# Patient Record
Sex: FEMALE | Race: BLACK OR AFRICAN AMERICAN | Employment: UNEMPLOYED | URBAN - METROPOLITAN AREA
[De-identification: names, ages, dates, MRNs, and addresses within clinical notes are randomized per-mention and may not be internally consistent; named-entity substitution may affect disease eponyms.]

---

## 2017-07-20 ENCOUNTER — HOSPITAL ENCOUNTER (EMERGENCY)
Age: 32
Discharge: HOME | End: 2017-07-20
Attending: EMERGENCY MEDICINE
Payer: MEDICAID

## 2017-07-20 VITALS
BODY MASS INDEX: 37.56 KG/M2 | DIASTOLIC BLOOD PRESSURE: 84 MMHG | RESPIRATION RATE: 20 BRPM | TEMPERATURE: 98.6 F | OXYGEN SATURATION: 100 % | SYSTOLIC BLOOD PRESSURE: 122 MMHG | HEIGHT: 64 IN | WEIGHT: 220 LBS | HEART RATE: 98 BPM
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
## 2017-07-20 DIAGNOSIS — M54.9 BACK PAIN, UNSPECIFIED BACK LOCATION, UNSPECIFIED BACK PAIN LATERALITY, UNSPECIFIED CHRONICITY: ICD-10-CM

## 2017-07-20 PROCEDURE — 99282 EMERGENCY DEPT VISIT SF MDM: CPT

## 2017-07-20 RX ORDER — NAPROXEN 500 MG/1
500 TABLET ORAL 2 TIMES DAILY WITH MEALS
COMMUNITY
End: 2018-12-02

## 2017-07-20 RX ORDER — HYDROCODONE BITARTRATE AND ACETAMINOPHEN 5; 325 MG/1; MG/1
TABLET ORAL
COMMUNITY
End: 2018-12-02

## 2017-07-20 RX ORDER — TRAMADOL HYDROCHLORIDE 50 MG/1
50 TABLET ORAL
Qty: 20 TAB | Refills: 0 | Status: SHIPPED | OUTPATIENT
Start: 2017-07-20 | End: 2018-12-02
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
## 2017-07-20 NOTE — LETTER
[facility] [place] 
[facility] 
[address] [address] [phone] Work/School Note Date: 7/20/2017 To Whom It May concern: 
 
Joyce Kennedy was seen and treated today in the emergency room by the following provider(s): 
Physician Assistant: XUAN Camargo. Please excuse missed work. Joyce Kennedy may return to work on 7/24/2017.  
 
Sincerely, 
 
 
 
 
Shane Muñoz PA-C
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
## 2017-09-27 ENCOUNTER — HOSPITAL ENCOUNTER (OUTPATIENT)
Dept: GENERAL RADIOLOGY | Age: 32
Discharge: HOME | End: 2017-09-27
Payer: COMMERCIAL

## 2017-09-27 DIAGNOSIS — M79.643 HAND PAIN: ICD-10-CM

## 2017-09-27 PROCEDURE — 73130 X-RAY EXAM OF HAND: CPT

## 2018-08-09 ENCOUNTER — HOSPITAL ENCOUNTER (OUTPATIENT)
Dept: GENERAL RADIOLOGY | Age: 33
Discharge: HOME | End: 2018-08-09
Attending: FAMILY MEDICINE
Payer: MEDICAID

## 2018-08-09 DIAGNOSIS — M79.672 LEFT FOOT PAIN: ICD-10-CM

## 2018-08-09 PROCEDURE — 73630 X-RAY EXAM OF FOOT: CPT

## 2018-12-02 ENCOUNTER — HOSPITAL ENCOUNTER (EMERGENCY)
Age: 33
Discharge: HOME | End: 2018-12-02
Attending: EMERGENCY MEDICINE
Payer: MEDICAID

## 2018-12-02 ENCOUNTER — APPOINTMENT (OUTPATIENT)
Dept: ULTRASOUND IMAGING | Age: 33
End: 2018-12-02
Attending: PHYSICIAN ASSISTANT
Payer: MEDICAID

## 2018-12-02 VITALS
HEIGHT: 64 IN | RESPIRATION RATE: 16 BRPM | OXYGEN SATURATION: 100 % | TEMPERATURE: 99.3 F | BODY MASS INDEX: 32.27 KG/M2 | SYSTOLIC BLOOD PRESSURE: 129 MMHG | DIASTOLIC BLOOD PRESSURE: 63 MMHG | WEIGHT: 189 LBS | HEART RATE: 90 BPM

## 2018-12-02 DIAGNOSIS — O26.891 PELVIC PAIN AFFECTING PREGNANCY IN FIRST TRIMESTER, ANTEPARTUM: Primary | ICD-10-CM

## 2018-12-02 DIAGNOSIS — N83.209 CYST OF OVARY, UNSPECIFIED LATERALITY: ICD-10-CM

## 2018-12-02 DIAGNOSIS — R10.2 PELVIC PAIN AFFECTING PREGNANCY IN FIRST TRIMESTER, ANTEPARTUM: Primary | ICD-10-CM

## 2018-12-02 LAB
ABO + RH BLD: NORMAL
ALBUMIN SERPL-MCNC: 3.9 G/DL (ref 3.4–5)
ALBUMIN/GLOB SERPL: 1.1 {RATIO} (ref 0.8–1.7)
ALP SERPL-CCNC: 93 U/L (ref 45–117)
ALT SERPL-CCNC: 16 U/L (ref 13–56)
ANION GAP SERPL CALC-SCNC: 9 MMOL/L (ref 3–18)
APPEARANCE UR: CLEAR
AST SERPL-CCNC: 7 U/L (ref 15–37)
BASOPHILS # BLD: 0 K/UL (ref 0–0.1)
BASOPHILS NFR BLD: 0 % (ref 0–2)
BILIRUB SERPL-MCNC: 0.3 MG/DL (ref 0.2–1)
BILIRUB UR QL: NEGATIVE
BUN SERPL-MCNC: 7 MG/DL (ref 7–18)
BUN/CREAT SERPL: 8
CALCIUM SERPL-MCNC: 9.4 MG/DL (ref 8.5–10.1)
CHLORIDE SERPL-SCNC: 105 MMOL/L (ref 100–108)
CO2 SERPL-SCNC: 25 MMOL/L (ref 21–32)
COLOR UR: YELLOW
CREAT SERPL-MCNC: 0.86 MG/DL (ref 0.6–1.3)
DIFFERENTIAL METHOD BLD: ABNORMAL
EOSINOPHIL # BLD: 0.2 K/UL (ref 0–0.4)
EOSINOPHIL NFR BLD: 1 % (ref 0–5)
ERYTHROCYTE [DISTWIDTH] IN BLOOD BY AUTOMATED COUNT: 16.6 % (ref 11.6–14.5)
GLOBULIN SER CALC-MCNC: 3.7 G/DL (ref 2–4)
GLUCOSE SERPL-MCNC: 108 MG/DL (ref 74–99)
GLUCOSE UR STRIP.AUTO-MCNC: NEGATIVE MG/DL
HCG SERPL-ACNC: ABNORMAL MIU/ML (ref 0–10)
HCG UR QL: POSITIVE
HCT VFR BLD AUTO: 38.1 % (ref 35–45)
HGB BLD-MCNC: 12.5 G/DL (ref 12–16)
HGB UR QL STRIP: NEGATIVE
KETONES UR QL STRIP.AUTO: ABNORMAL MG/DL
LEUKOCYTE ESTERASE UR QL STRIP.AUTO: NEGATIVE
LYMPHOCYTES # BLD: 3.3 K/UL (ref 0.9–3.6)
LYMPHOCYTES NFR BLD: 27 % (ref 21–52)
MCH RBC QN AUTO: 21 PG (ref 24–34)
MCHC RBC AUTO-ENTMCNC: 32.8 G/DL (ref 31–37)
MCV RBC AUTO: 63.9 FL (ref 74–97)
MONOCYTES # BLD: 0.6 K/UL (ref 0.05–1.2)
MONOCYTES NFR BLD: 5 % (ref 3–10)
NEUTS SEG # BLD: 8.3 K/UL (ref 1.8–8)
NEUTS SEG NFR BLD: 67 % (ref 40–73)
NITRITE UR QL STRIP.AUTO: NEGATIVE
PH UR STRIP: 5.5 [PH] (ref 5–8)
PLATELET # BLD AUTO: 281 K/UL (ref 135–420)
POTASSIUM SERPL-SCNC: 3.8 MMOL/L (ref 3.5–5.5)
PROT SERPL-MCNC: 7.6 G/DL (ref 6.4–8.2)
PROT UR STRIP-MCNC: NEGATIVE MG/DL
RBC # BLD AUTO: 5.96 M/UL (ref 4.2–5.3)
SERVICE CMNT-IMP: NORMAL
SODIUM SERPL-SCNC: 139 MMOL/L (ref 136–145)
SP GR UR REFRACTOMETRY: 1.03 (ref 1–1.03)
UROBILINOGEN UR QL STRIP.AUTO: 1 EU/DL (ref 0.2–1)
WBC # BLD AUTO: 12.4 K/UL (ref 4.6–13.2)
WET PREP GENITAL: NORMAL

## 2018-12-02 PROCEDURE — 87210 SMEAR WET MOUNT SALINE/INK: CPT

## 2018-12-02 PROCEDURE — 85025 COMPLETE CBC W/AUTO DIFF WBC: CPT

## 2018-12-02 PROCEDURE — 81025 URINE PREGNANCY TEST: CPT

## 2018-12-02 PROCEDURE — 99284 EMERGENCY DEPT VISIT MOD MDM: CPT

## 2018-12-02 PROCEDURE — 81003 URINALYSIS AUTO W/O SCOPE: CPT

## 2018-12-02 PROCEDURE — 93976 VASCULAR STUDY: CPT

## 2018-12-02 PROCEDURE — 80053 COMPREHEN METABOLIC PANEL: CPT

## 2018-12-02 PROCEDURE — 86900 BLOOD TYPING SEROLOGIC ABO: CPT

## 2018-12-02 PROCEDURE — 84702 CHORIONIC GONADOTROPIN TEST: CPT

## 2018-12-02 PROCEDURE — 87491 CHLMYD TRACH DNA AMP PROBE: CPT

## 2018-12-02 NOTE — ED PROVIDER NOTES
EMERGENCY DEPARTMENT HISTORY AND PHYSICAL EXAM 
 
Date: 2018 Patient Name: Gail Lemus History of Presenting Illness Chief Complaint Patient presents with  Abdominal Pain History Provided By: Patient Chief Complaint: RLQ pain Duration: 2 days Timing:  Constant Location: RLQ Quality: Aching Severity: 8 out of 10 Associated Symptoms: back pain, nausea, breast tenderness Additional History (Context):  
8:48 AM 
Gail Lemus is a 28 y.o. female with no relevant PMHX presents to the emergency department C/O aching RLQ pain (8/10) onset 2 days ago. Associated sxs include back pain, nausea, breast tenderness. She notes she was doing some heavy lifting two days ago, and the pain came on later. Pt denies previous episodes of similar symptoms. LNMP 10/18/2018, she admits she may be pregnant. A0. SHx  in . Pt denies vomiting, diarrhea, abnormal BMs, vaginal bleeding, vaginal discharge, dysuria, SHx appendicitis, and any other sxs or complaints. PCP: Clint Song MD 
 
 
 
Past History Past Medical History: 
History reviewed. No pertinent past medical history. Past Surgical History: 
Past Surgical History:  
Procedure Laterality Date  HX GYN    
  Family History: 
History reviewed. No pertinent family history. Social History: 
Social History Tobacco Use  Smoking status: Never Smoker  Smokeless tobacco: Never Used Substance Use Topics  Alcohol use: No  
 Drug use: No  
 
 
Allergies: Allergies Allergen Reactions  Penicillins Rash Review of Systems Review of Systems Constitutional: Negative for chills and fever. Gastrointestinal: Positive for abdominal pain (RLQ) and nausea. Negative for diarrhea and vomiting.  
     (-) Abnormal BMs Genitourinary: Negative for difficulty urinating, dysuria, flank pain, frequency, hematuria, urgency, vaginal bleeding and vaginal discharge. Musculoskeletal: Positive for back pain. Skin:  
     (+) Breast tenderness All other systems reviewed and are negative. Physical Exam  
 
Vitals:  
 12/02/18 0842 12/02/18 1145 BP: 135/75 129/63 Pulse: 95 90 Resp: 16 16 Temp: 99.2 °F (37.3 °C) 99.3 °F (37.4 °C) SpO2: 100% 100% Weight: 85.7 kg (189 lb) Height: 5' 4\" (1.626 m) Physical Exam  
Constitutional: She is oriented to person, place, and time. She appears well-developed and well-nourished. No distress. Female in NAD. Alert. Appears comfortable. HENT:  
Head: Normocephalic and atraumatic. Right Ear: External ear normal.  
Left Ear: External ear normal.  
Nose: Nose normal.  
Eyes: Conjunctivae are normal.  
Neck: Normal range of motion. Cardiovascular: Normal rate, regular rhythm, normal heart sounds and intact distal pulses. Exam reveals no gallop and no friction rub. No murmur heard. Pulmonary/Chest: Effort normal and breath sounds normal. No accessory muscle usage. No tachypnea. No respiratory distress. She has no decreased breath sounds. She has no wheezes. She has no rhonchi. Abdominal: Soft. Normal appearance. She exhibits no mass. There is tenderness (right sided pelvic tenderness). There is no rigidity, no rebound, no guarding, no CVA tenderness and no tenderness at McBurney's point. Musculoskeletal: Normal range of motion. Neurological: She is alert and oriented to person, place, and time. Skin: Skin is warm and dry. She is not diaphoretic. Psychiatric: She has a normal mood and affect. Judgment normal.  
Nursing note and vitals reviewed. Diagnostic Study Results Labs: 
  
No results found for this or any previous visit (from the past 12 hour(s)). Radiologic Studies: 
US UTS TRANSVAGINAL OB Final Result IMPRESSION: 
  
1. 6.0 week live intrauterine gestation with a small subchorionic hemorrhage, as 
described above. 
  
 2.  Left ovarian/adnexal cysts, likely follicular and/or corpus luteal in 
etiology. 
  
Dedicated obstetric follow-up is required. CT Results  (Last 48 hours) None CXR Results  (Last 48 hours) None MEDICATIONS GIVEN: 
Medications - No data to display Medical Decision Making I am the first provider for this patient. I reviewed the vital signs, available nursing notes, past medical history, past surgical history, family history and social history. Vital Signs: Reviewed the patient's vital signs. Pulse Oximetry Analysis: 100% on RA Records Reviewed: Nursing Notes and Old Medical Records Provider Notes (Medical Decision Making): ectopic, miscarriage, STI, UTI, cyst. Doubt torsion. Doubt appy Procedures:  
Pelvic Exam 
Date/Time: 12/2/2018 9:07 AM 
Procedure duration:  15 minutes. Documented by: Pam Glass PA-C. Exam assisted by:  Female chaperone in room. Type of exam performed: bimanual and speculum. External genitalia appearance: normal.   
Vaginal exam:  discharge. The amount of discharge was:  moderate. The discharge was white and thick. Cervical exam:  normal.   
Specimen(s) collected:  chlamydia, GC and vaginal culture. Bimanual exam:  normal.   
Patient tolerance: Patient tolerated the procedure well with no immediate complications ED Course:  
8:48 AM Initial assessment performed. The patients presenting problems have been discussed, and they are in agreement with the care plan formulated and outlined with them. I have encouraged them to ask questions as they arise throughout their visit. Diagnosis and Disposition US reveals living IUP. Labs unremarkable. No bleeding. Unremarkable pelvic. Evidence of cyst. OB FU. Reasons to RTED discussed with pt. All questions answered. Pt feels comfortable going home at this time. Pt expressed understanding and she agrees with plan.  
 
 
DISCHARGE NOTE: 
11:43 AM 
 Mamta Mohr  results have been reviewed with her. She has been counseled regarding her diagnosis, treatment, and plan. She verbally conveys understanding and agreement of the signs, symptoms, diagnosis, treatment and prognosis and additionally agrees to follow up as discussed. She also agrees with the care-plan and conveys that all of her questions have been answered. I have also provided discharge instructions for her that include: educational information regarding their diagnosis and treatment, and list of reasons why they would want to return to the ED prior to their follow-up appointment, should her condition change. She has been provided with education for proper emergency department utilization. CLINICAL IMPRESSION: 
 
1. Pelvic pain affecting pregnancy in first trimester, antepartum 2. Cyst of ovary, unspecified laterality PLAN: 
1. D/C Home 2. There are no discharge medications for this patient. 3.  
Follow-up Information Follow up With Specialties Details Why Contact Info Smita Trammell MD Obstetrics & Gynecology, Gynecology, Obstetrics Schedule an appointment as soon as possible for a visit in 2 days Follow up with [address] [place] [address] [address] [place] [address] 
[phone] [facility] EMERGENCY DEPT Emergency Medicine Go to As needed, If symptoms worsen [address] [place] [address] 
[phone]  
  
 
_______________________________ Attestations:  
 
SCRIBE ATTESTATION: 
This note is prepared by Leslie Jimenez, acting as Scribe for Remedios Chris PA-C. 
 
PROVIDER ATTESTATION: 
Remedios Chris PA-C: The scribe's documentation has been prepared under my direction and personally reviewed by me in its entirety. I confirm that the note above accurately reflects all work, treatment, procedures, and medical decision making performed by me.  
_______________________________

## 2018-12-02 NOTE — DISCHARGE INSTRUCTIONS
Functional Ovarian Cyst: Care Instructions  Your Care Instructions    A functional ovarian cyst is a sac that forms on the surface of a woman's ovary during ovulation. The sac holds a maturing egg. Usually the sac goes away after the egg is released. But if the egg is not released, or if the sac closes up after the egg is released, the sac can swell up with fluid and form a cyst.  Functional ovarian cysts are different than ovarian growths caused by other problems, such as cancer. Most functional ovarian cysts cause no symptoms and go away on their own. Some cause mild pain. Others can cause severe pain when they rupture or bleed. Follow-up care is a key part of your treatment and safety. Be sure to make and go to all appointments, and call your doctor if you are having problems. It's also a good idea to know your test results and keep a list of the medicines you take. How can you care for yourself at home? · Use heat, such as a hot water bottle, a heating pad set on low, or a warm bath, to relax tense muscles and relieve cramping. · Be safe with medicines. Take pain medicines exactly as directed. ? If the doctor gave you a prescription medicine for pain, take it as prescribed. ? If you are not taking a prescription pain medicine, ask your doctor if you can take an over-the-counter medicine. · Avoid constipation. Make sure you drink enough fluids and include fruits, vegetables, and fiber in your diet each day. Constipation does not cause ovarian cysts, but it may make your pelvic pain worse. When should you call for help? Call your doctor now or seek immediate medical care if:    · You have severe vaginal bleeding.     · You have new or worse belly or pelvic pain.    Watch closely for changes in your health, and be sure to contact your doctor if:    · You have unusual vaginal bleeding.     · You do not get better as expected. Where can you learn more?   Go to http://herson-pavithra.info/. Enter O021 in the search box to learn more about \"Functional Ovarian Cyst: Care Instructions. \"  Current as of: May 15, 2018  Content Version: 11.8  © 4413-6565 [employer]. Care instructions adapted under license by [employer] (which disclaims liability or warranty for this information). If you have questions about a medical condition or this instruction, always ask your healthcare professional. [address] any warranty or liability for your use of this information.

## 2018-12-02 NOTE — LETTER
[facility] [place] 
[facility] EMERGENCY DEPT 
[address] [address] 
[phone] Work/School Note Date: 12/2/2018 To Whom It May concern: 
 
Tez Nuñez was seen and treated today in the emergency room by the following provider(s): 
Attending Provider: Walker Juares MD 
Physician Assistant: Zina Purvis PA-C. Tez Nuñez may return to work on 12/03/2018. Sincerely, Kaitlin Mercado PA-C

## 2018-12-03 LAB
C TRACH RRNA SPEC QL NAA+PROBE: NEGATIVE
N GONORRHOEA RRNA SPEC QL NAA+PROBE: NEGATIVE
SPECIMEN SOURCE: NORMAL

## 2018-12-19 LAB
ANTIBODY SCREEN, EXTERNAL: NEGATIVE
CHLAMYDIA, EXTERNAL: NEGATIVE
HBSAG, EXTERNAL: NEGATIVE
HIV, EXTERNAL: NEGATIVE
N. GONORRHEA, EXTERNAL: NEGATIVE
RPR, EXTERNAL: NORMAL
RUBELLA, EXTERNAL: NORMAL
TYPE, ABO & RH, EXTERNAL: NORMAL
URINALYSIS, EXTERNAL: NEGATIVE

## 2018-12-22 ENCOUNTER — HOSPITAL ENCOUNTER (EMERGENCY)
Age: 33
Discharge: HOME | End: 2018-12-22
Attending: EMERGENCY MEDICINE
Payer: MEDICAID

## 2018-12-22 VITALS
TEMPERATURE: 98 F | OXYGEN SATURATION: 100 % | BODY MASS INDEX: 38.41 KG/M2 | RESPIRATION RATE: 16 BRPM | DIASTOLIC BLOOD PRESSURE: 79 MMHG | WEIGHT: 225 LBS | SYSTOLIC BLOOD PRESSURE: 132 MMHG | HEIGHT: 64 IN | HEART RATE: 89 BPM

## 2018-12-22 DIAGNOSIS — O26.851 SPOTTING AFFECTING PREGNANCY IN FIRST TRIMESTER: Primary | ICD-10-CM

## 2018-12-22 LAB
ERYTHROCYTE [DISTWIDTH] IN BLOOD BY AUTOMATED COUNT: 16.5 % (ref 11.6–14.5)
HCG SERPL-ACNC: ABNORMAL MIU/ML (ref 0–10)
HCT VFR BLD AUTO: 36.6 % (ref 35–45)
HGB BLD-MCNC: 11.8 G/DL (ref 12–16)
MCH RBC QN AUTO: 20.8 PG (ref 24–34)
MCHC RBC AUTO-ENTMCNC: 32.2 G/DL (ref 31–37)
MCV RBC AUTO: 64.6 FL (ref 74–97)
PLATELET # BLD AUTO: 249 K/UL (ref 135–420)
RBC # BLD AUTO: 5.67 M/UL (ref 4.2–5.3)
WBC # BLD AUTO: 12.7 K/UL (ref 4.6–13.2)

## 2018-12-22 PROCEDURE — 85027 COMPLETE CBC AUTOMATED: CPT

## 2018-12-22 PROCEDURE — 99283 EMERGENCY DEPT VISIT LOW MDM: CPT

## 2018-12-22 PROCEDURE — 84702 CHORIONIC GONADOTROPIN TEST: CPT

## 2018-12-22 NOTE — DISCHARGE INSTRUCTIONS
Vaginal Bleeding in Nonpregnant Women: Care Instructions  Your Care Instructions    Many women have bleeding or spotting between periods. Lots of things can cause it. You may bleed because of hormone problems, stress, or ovulation. Fibroids and IUDs (intrauterine devices) can also cause bleeding. If your bleeding or spotting is caused by one of these things and is not heavy or doesn't happen often, you probably don't need to worry. But in rare cases, infection, cancer, or other serious conditions can cause bleeding. So you may need more tests to find the cause of your bleeding. The doctor has checked you carefully, but problems can develop later. If you notice any problems or new symptoms, get medical treatment right away. Follow-up care is a key part of your treatment and safety. Be sure to make and go to all appointments, and call your doctor if you are having problems. It's also a good idea to know your test results and keep a list of the medicines you take. How can you care for yourself at home? · Take pain medicines exactly as directed. ? If the doctor gave you a prescription medicine for pain, take it as prescribed. ? If you are not taking a prescription pain medicine, ask your doctor if you can take an over-the-counter medicine. Do not take aspirin, which may make bleeding worse. · If your doctor prescribed birth control pills for your bleeding, take them as directed. · Eat foods that are high in iron and vitamin C. Foods high in iron include red meat, shellfish, eggs, beans, and leafy green vegetables. Foods high in vitamin C include citrus fruits, tomatoes, and broccoli. Ask your doctor if you need to take iron pills or a multivitamin. · Ask your doctor when it is okay to have sex. When should you call for help? Call 911 anytime you think you may need emergency care.  For example, call if:    · You passed out (lost consciousness).    Call your doctor now or seek immediate medical care if:    · You have severe vaginal bleeding.     · You are dizzy or lightheaded, or you feel like you may faint.     · You have new or worse belly or pelvic pain.    Watch closely for changes in your health, and be sure to contact your doctor if:    · Your bleeding gets worse.     · You think you might be pregnant.     · You do not get better as expected. Where can you learn more? Go to http://herson-pavithra.info/. Enter Q734 in the search box to learn more about \"Vaginal Bleeding in Nonpregnant Women: Care Instructions. \"  Current as of: May 15, 2018  Content Version: 11.8  © 4616-9676 [employer]. Care instructions adapted under license by [employer] (which disclaims liability or warranty for this information). If you have questions about a medical condition or this instruction, always ask your healthcare professional. [address] any warranty or liability for your use of this information.

## 2018-12-22 NOTE — LETTER
[facility] [place] 
[facility] 
[address] [address] 
[phone] Work/School Note Date: 12/22/2018 To Whom It May concern: 
 
Gely Zhao was seen and treated today in the emergency room by the following provider(s): 
Attending Provider: Keegan Lazcano MD 
Physician Assistant: Kalpesh Zamudio. Gely Zhao may return to work on 12/23/2018. Sincerely, Miya Mon PA-C

## 2018-12-22 NOTE — ED TRIAGE NOTES
Pt states that she is 8 weeks pregnant, went to restroom and noticed she was spotting blood when she wiped;   Denies abd pain at present

## 2018-12-22 NOTE — ED NOTES
I have reviewed discharge instructions with the patient. The patient verbalized understanding. Patient armband removed and shredded. Work note provided. Ambulated to lobby with stable gait.

## 2018-12-22 NOTE — ED PROVIDER NOTES
EMERGENCY DEPARTMENT HISTORY AND PHYSICAL EXAM    Date: 2018  Patient Name: Martha Hand    History of Presenting Illness     Chief Complaint   Patient presents with    Pregnancy Problem         History Provided By: Patient    Chief Complaint: Vaginal spotting  Duration: PTA  Timing:  Acute  Location: Vagina  Severity: Mild     Additional History (Context):   9:53 AM  Martha Hand is a 28 y.o. female C/O vaginal \"pinkish\" spotting onset PTA x 1 episode. A0 ~8 weeks pregnant. Followed by [facility].- last seen 3 days ago- reports nml US, bloodwork. Previously seen here on 2018 for RLQ pain and spotting. IUP confirmed via US and labs age appropriate. OB has placed her on light-duty restrictions at work. with frequent breaks and no lifting over 20 lbs. Pt denies cramping abdominal pain, recent heavy lifting, recent strenuous activity, recent sexual intercourse. PCP: Ermias Crisostomo MD        Past History     Past Medical History:  History reviewed. No pertinent past medical history. Past Surgical History:  Past Surgical History:   Procedure Laterality Date    HX GYN             Family History:  History reviewed. No pertinent family history. Social History:  Social History     Tobacco Use    Smoking status: Never Smoker    Smokeless tobacco: Never Used   Substance Use Topics    Alcohol use: No    Drug use: No       Allergies: Allergies   Allergen Reactions    Penicillins Rash       Review of Systems   Review of Systems   Constitutional: Negative for activity change and fatigue. Gastrointestinal: Negative for abdominal pain. Genitourinary: Positive for vaginal bleeding (minor \"pinkish\" spotting). Negative for dysuria, pelvic pain, vaginal discharge and vaginal pain.        Physical Exam     Vitals:    18 0941   BP: 132/79   Pulse: 89   Resp: 16   Temp: 98 °F (36.7 °C)   SpO2: 100%   Weight: 102.1 kg (225 lb)   Height: 5' 4\" (1.626 m)     Physical Exam   Nursing note and vitals reviewed. NURSING NOTE AND VITALS REVIEWED. PSHX, PFHX, PMHX REVIEWED. CONSTITUTIONAL: Well developed/nourished. Awake, alert. Non-toxic appearance. Not diaphoretic. Afebrile. Laying comfortable. Talking on phone. HEAD: NC/AT. EYES: EOMI. Sclera anicteric. ENT: Ears normal to external inspection. Mucous membranes moist    NECK: Neck supple without meningismus. PULMONARY/CHEST: No acute respiratory distress. Talking in full sentences w/o accessory muscle use. ABDOMINAL: Obese. Soft. Non-distended. No tenderness. No fetal parts palpated, uterus difficult to palpate 2/2 habitus. No rebound, guarding, or rigidity. : chaperoned by Gianna Lo RN. Bimanual exam only- os thick, not effaced and closed. Scant blood on gloves. MUSCULOSKELETAL: FROM x 4. No muscular tenderness. No pedal edema. SKIN:  Skin warm and dry. No diaphoresis, rashes or lesions visible. Skin intact. NEUROLOGICAL: Alert, awake. Age appropriate behavior. Appropriately oriented. Normal speech. neurological deficits appreciated. Diagnostic Study Results     Labs:     Recent Results (from the past 12 hour(s))   CBC W/O DIFF    Collection Time: 12/22/18 10:25 AM   Result Value Ref Range    WBC 12.7 4.6 - 13.2 K/uL    RBC 5.67 (H) 4.20 - 5.30 M/uL    HGB 11.8 (L) 12.0 - 16.0 g/dL    HCT 36.6 35.0 - 45.0 %    MCV 64.6 (L) 74.0 - 97.0 FL    MCH 20.8 (L) 24.0 - 34.0 PG    MCHC 32.2 31.0 - 37.0 g/dL    RDW 16.5 (H) 11.6 - 14.5 %    PLATELET 232 923 - 711 K/uL   BETA HCG, QT    Collection Time: 12/22/18 10:25 AM   Result Value Ref Range    Beta HCG, QT 93,450 (H) 0 - 10 MIU/ML       Radiologic Studies:  No orders to display     CT Results  (Last 48 hours)    None        CXR Results  (Last 48 hours)    None          Medical Decision Making   I am the first provider for this patient.     I reviewed the vital signs, available nursing notes, past medical history, past surgical history, family history and social history. Vital Signs: Reviewed the patient's vital signs. Pulse Oximetry Analysis: 100% on RA    Records Reviewed: Nursing Notes, Old Medical Records, Previous Radiology Studies and Previous Laboratory Studies     Procedures:  Procedures    ED Course:   9:53 AM Initial assessment performed. The patients presenting problems have been discussed, and they are in agreement with the care plan formulated and outlined with them. I have encouraged them to ask questions as they arise throughout their visit. MDM: prior ED note and corresponding labs/imaging reviewed. + IUP w/ small subchorionic hemorrhage, quant age appropriate. ABO + no rhogam required. Today beta quant risen appropriately for 8 wks gestation. No acute drop in H&H. Os closed. DDx: nml 1st trimester spotting vs subchorionic hemorrhage vs threatened AB. Continue w/ light duty restrictions and fu w/ OB this week. RTN ED if bleeding worsens or pain develops. Stable for dc home. Diagnosis and Disposition     DISCHARGE NOTE:  12:08 PM  Araseli Leahy  results have been reviewed with her. She has been counseled regarding her diagnosis, treatment, and plan. She verbally conveys understanding and agreement of the signs, symptoms, diagnosis, treatment and prognosis and additionally agrees to follow up as discussed. She also agrees with the care-plan and conveys that all of her questions have been answered. I have also provided discharge instructions for her that include: educational information regarding their diagnosis and treatment, and list of reasons why they would want to return to the ED prior to their follow-up appointment, should her condition change. She has been provided with education for proper emergency department utilization. CLINICAL IMPRESSION:    1. Spotting affecting pregnancy in first trimester        PLAN:  1. D/C Home  2. There are no discharge medications for this patient.     3.   Follow-up Information     Follow up With Specialties Details Why Contact Info    Logan Gallardo MD Family Practice Schedule an appointment as soon as possible for a visit in 2 days Follow up with Primary Care.  [address]  [address] [place] [address] [address]      [facility] Emergency Medicine Go to As needed, If symptoms worsen [address]  [address] [facility] [address] [address] [place] [address] And Internal Medicine  Schedule an appointment as soon as possible for a visit in 2 days Follow up with OBGYN. [address]  [phone]        ___________________________     Attestations:     SCRIBE ATTESTATION:  This note is prepared by Constance Rodríguez, acting as Scribe for Baldo Pelayo PA-C.    PROVIDER ATTESTATION:  Baldo Pelayo PA-C: The scribe's documentation has been prepared under my direction and personally reviewed by me in its entirety.  I confirm that the note above accurately reflects all work, treatment, procedures, and medical decision making performed by me.   ___________________________

## 2019-06-20 LAB — GRBS, EXTERNAL: NEGATIVE

## 2019-07-19 ENCOUNTER — HOSPITAL ENCOUNTER (INPATIENT)
Age: 34
LOS: 4 days | Discharge: HOME | DRG: 540 | End: 2019-07-23
Attending: OBSTETRICS & GYNECOLOGY | Admitting: OBSTETRICS & GYNECOLOGY
Payer: MEDICAID

## 2019-07-19 DIAGNOSIS — O34.211 MATERNAL CARE DUE TO LOW TRANSVERSE UTERINE SCAR FROM PREVIOUS CESAREAN DELIVERY: Primary | ICD-10-CM

## 2019-07-19 PROBLEM — O34.219 MATERNAL CARE FOR SCAR FROM PREVIOUS CESAREAN DELIVERY: Status: ACTIVE | Noted: 2019-07-19

## 2019-07-19 PROBLEM — Z3A.38 38 WEEKS GESTATION OF PREGNANCY: Status: ACTIVE | Noted: 2019-07-19

## 2019-07-19 LAB
BASOPHILS # BLD: 0 K/UL (ref 0–0.1)
BASOPHILS NFR BLD: 0 % (ref 0–2)
DIFFERENTIAL METHOD BLD: ABNORMAL
EOSINOPHIL # BLD: 0.2 K/UL (ref 0–0.4)
EOSINOPHIL NFR BLD: 2 % (ref 0–5)
ERYTHROCYTE [DISTWIDTH] IN BLOOD BY AUTOMATED COUNT: 17.8 % (ref 11.6–14.5)
HCT VFR BLD AUTO: 36.7 % (ref 35–45)
HGB BLD-MCNC: 12 G/DL (ref 12–16)
LYMPHOCYTES # BLD: 2.8 K/UL (ref 0.9–3.6)
LYMPHOCYTES NFR BLD: 27 % (ref 21–52)
MCH RBC QN AUTO: 20.9 PG (ref 24–34)
MCHC RBC AUTO-ENTMCNC: 32.7 G/DL (ref 31–37)
MCV RBC AUTO: 64 FL (ref 74–97)
MONOCYTES # BLD: 0.6 K/UL (ref 0.05–1.2)
MONOCYTES NFR BLD: 6 % (ref 3–10)
NEUTS SEG # BLD: 7.1 K/UL (ref 1.8–8)
NEUTS SEG NFR BLD: 65 % (ref 40–73)
PLATELET # BLD AUTO: 218 K/UL (ref 135–420)
RBC # BLD AUTO: 5.73 M/UL (ref 4.2–5.3)
WBC # BLD AUTO: 10.7 K/UL (ref 4.6–13.2)

## 2019-07-19 PROCEDURE — 85025 COMPLETE CBC W/AUTO DIFF WBC: CPT

## 2019-07-19 PROCEDURE — 65270000029 HC RM PRIVATE

## 2019-07-19 PROCEDURE — 86900 BLOOD TYPING SEROLOGIC ABO: CPT

## 2019-07-19 PROCEDURE — 75410000002 HC LABOR FEE PER 1 HR

## 2019-07-19 PROCEDURE — 86923 COMPATIBILITY TEST ELECTRIC: CPT

## 2019-07-19 RX ORDER — TERBUTALINE SULFATE 1 MG/ML
0.25 INJECTION SUBCUTANEOUS
Status: DISCONTINUED | OUTPATIENT
Start: 2019-07-19 | End: 2019-07-20 | Stop reason: HOSPADM

## 2019-07-19 RX ORDER — ONDANSETRON 2 MG/ML
4 INJECTION INTRAMUSCULAR; INTRAVENOUS
Status: DISCONTINUED | OUTPATIENT
Start: 2019-07-19 | End: 2019-07-20 | Stop reason: HOSPADM

## 2019-07-19 RX ORDER — LIDOCAINE HYDROCHLORIDE 10 MG/ML
20 INJECTION, SOLUTION EPIDURAL; INFILTRATION; INTRACAUDAL; PERINEURAL AS NEEDED
Status: DISCONTINUED | OUTPATIENT
Start: 2019-07-19 | End: 2019-07-20 | Stop reason: HOSPADM

## 2019-07-19 RX ORDER — OXYTOCIN/0.9 % SODIUM CHLORIDE 20/1000 ML
125 PLASTIC BAG, INJECTION (ML) INTRAVENOUS CONTINUOUS
Status: DISCONTINUED | OUTPATIENT
Start: 2019-07-19 | End: 2019-07-20 | Stop reason: HOSPADM

## 2019-07-19 RX ORDER — SODIUM CHLORIDE, SODIUM LACTATE, POTASSIUM CHLORIDE, CALCIUM CHLORIDE 600; 310; 30; 20 MG/100ML; MG/100ML; MG/100ML; MG/100ML
125 INJECTION, SOLUTION INTRAVENOUS CONTINUOUS
Status: DISCONTINUED | OUTPATIENT
Start: 2019-07-19 | End: 2019-07-20 | Stop reason: HOSPADM

## 2019-07-19 RX ORDER — BUTORPHANOL TARTRATE 2 MG/ML
2 INJECTION INTRAMUSCULAR; INTRAVENOUS
Status: DISCONTINUED | OUTPATIENT
Start: 2019-07-19 | End: 2019-07-20 | Stop reason: HOSPADM

## 2019-07-19 RX ORDER — HYDROMORPHONE HYDROCHLORIDE 1 MG/ML
1 INJECTION, SOLUTION INTRAMUSCULAR; INTRAVENOUS; SUBCUTANEOUS
Status: DISCONTINUED | OUTPATIENT
Start: 2019-07-19 | End: 2019-07-20 | Stop reason: HOSPADM

## 2019-07-19 RX ORDER — NALBUPHINE HYDROCHLORIDE 10 MG/ML
10 INJECTION, SOLUTION INTRAMUSCULAR; INTRAVENOUS; SUBCUTANEOUS
Status: DISCONTINUED | OUTPATIENT
Start: 2019-07-19 | End: 2019-07-20 | Stop reason: HOSPADM

## 2019-07-19 RX ORDER — MINERAL OIL
30 OIL (ML) ORAL AS NEEDED
Status: DISCONTINUED | OUTPATIENT
Start: 2019-07-19 | End: 2019-07-20 | Stop reason: HOSPADM

## 2019-07-19 RX ORDER — METHYLERGONOVINE MALEATE 0.2 MG/ML
0.2 INJECTION INTRAVENOUS AS NEEDED
Status: DISCONTINUED | OUTPATIENT
Start: 2019-07-19 | End: 2019-07-20 | Stop reason: HOSPADM

## 2019-07-19 RX ORDER — OXYTOCIN/0.9 % SODIUM CHLORIDE 20/1000 ML
999 PLASTIC BAG, INJECTION (ML) INTRAVENOUS ONCE
Status: ACTIVE | OUTPATIENT
Start: 2019-07-19 | End: 2019-07-20

## 2019-07-19 NOTE — PROGRESS NOTES
Problem: Patient Education: Go to Patient Education Activity  Goal: Patient/Family Education  Outcome: Progressing Towards Goal     Problem: Vaginal Delivery: Day of Deliver-Laboring  Goal: Activity/Safety  Outcome: Progressing Towards Goal  Goal: Consults, if ordered  Outcome: Progressing Towards Goal  Goal: Diagnostic Test/Procedures  Outcome: Progressing Towards Goal  Goal: Nutrition/Diet  Outcome: Progressing Towards Goal  Goal: Discharge Planning  Outcome: Progressing Towards Goal  Goal: Medications  Outcome: Progressing Towards Goal  Goal: Respiratory  Outcome: Progressing Towards Goal  Goal: Treatments/Interventions/Procedures  Outcome: Progressing Towards Goal  Goal: *Vital signs within defined limits  Outcome: Progressing Towards Goal  Goal: *Labs within defined limits  Outcome: Progressing Towards Goal  Goal: *Hemodynamically stable  Outcome: Progressing Towards Goal  Goal: *Optimal pain control at patient's stated goal  Outcome: Progressing Towards Goal     Problem: Vaginal Delivery: Day of Delivery-Post delivery  Goal: Activity/Safety  Outcome: Progressing Towards Goal  Goal: Consults, if ordered  Outcome: Progressing Towards Goal  Goal: Nutrition/Diet  Outcome: Progressing Towards Goal  Goal: Discharge Planning  Outcome: Progressing Towards Goal  Goal: Medications  Outcome: Progressing Towards Goal  Goal: Treatments/Interventions/Procedures  Outcome: Progressing Towards Goal  Goal: *Vital signs within defined limits  Outcome: Progressing Towards Goal  Goal: *Labs within defined limits  Outcome: Progressing Towards Goal  Goal: *Hemodynamically stable  Outcome: Progressing Towards Goal  Goal: *Optimal pain control at patient's stated goal  Outcome: Progressing Towards Goal  Goal: *Participates in infant care  Outcome: Progressing Towards Goal  Goal: *Demonstrates progressive activity  Outcome: Progressing Towards Goal  Goal: *Tolerating diet  Outcome: Progressing Towards Goal     Problem: Pain  Goal: *PALLIATIVE CARE:  Alleviation of Pain  Outcome: Progressing Towards Goal     Problem: Patient Education: Go to Patient Education Activity  Goal: Patient/Family Education  Outcome: Progressing Towards Goal

## 2019-07-19 NOTE — H&P
History & Physical    Name: Zuleika Shirley MRN: [id number]  SSN: [id number]    YOB: 1985  Age: 28 y.o. Sex: female        Subjective:     Estimated Date of Delivery: 19  OB History    Para Term  AB Living   1             SAB TAB Ectopic Molar Multiple Live Births                    # Outcome Date GA Lbr Elijah/2nd Weight Sex Delivery Anes PTL Lv   1 Current                Ms. Kehinde White is admitted with pregnancy at 38w3d for rupture of membranes. Prenatal course was complicated by prior c/s x1, obesity. Please see prenatal records for details. No past medical history on file. Past Surgical History:   Procedure Laterality Date    HX GYN           Social History     Occupational History    Not on file   Tobacco Use    Smoking status: Never Smoker    Smokeless tobacco: Never Used   Substance and Sexual Activity    Alcohol use: No    Drug use: No    Sexual activity: Not on file     No family history on file. Allergies   Allergen Reactions    Penicillins Rash     Prior to Admission medications    Medication Sig Start Date End Date Taking? Authorizing Provider   prenatal vit-calcium-iron-fa (PRENATAL PLUS, CALCIUM CARB,) 27 mg iron- 1 mg tab Take 1 Tab by mouth daily. Yes Provider, Historical   ferrous sulfate (IRON) 325 mg (65 mg iron) tablet Take  by mouth Daily (before breakfast). Yes Provider, Historical        Review of Systems: A comprehensive review of systems was negative except for that written in the HPI. Objective:     Vitals: There were no vitals filed for this visit. Physical Exam:  Cervical Exam: 3 cm dilated    60% effaced    -2 station    Presenting Part: cephalic  Cervical Position: mid position  Membranes:  Premature Rupture of Membranes;  Amniotic Fluid: thin meconium fluid  Fetal Heart Rate: Baseline: 130 per minute  Variability: moderate  Accelerations: yes  Decelerations: none  Uterine contractions: irregular, every 2-5 minutes    Prenatal Labs:   Lab Results   Component Value Date/Time    ABO/Rh(D) O POSITIVE 12/02/2018 09:20 AM         Assessment/Plan:     Active Problems:    * No active hospital problems. *       Plan: Admit for Reassuring fetal status, Labor  Progressing normally, Continue plan for vaginal delivery. Group B Strep was negative.

## 2019-07-19 NOTE — PROGRESS NOTES
1800  38+3 weeks gestation grossly ruptured. States she ruptured at [address]     [address]VE 3/60/-3 by this nurse. Meconium stained amniotic fluid noted.  IV inserted, blood drawn and line flushed.  Bedside and Verbal shift change report given to Louis De La Vega (oncoming nurse) by Deidra Toure RN   (offgoing nurse). Report included the following information SBAR, Kardex, Intake/Output, MAR, Recent Results and Med Rec Status.

## 2019-07-20 ENCOUNTER — ANESTHESIA EVENT (OUTPATIENT)
Dept: LABOR AND DELIVERY | Age: 34
DRG: 540 | End: 2019-07-20
Payer: MEDICAID

## 2019-07-20 ENCOUNTER — ANESTHESIA (OUTPATIENT)
Dept: LABOR AND DELIVERY | Age: 34
DRG: 540 | End: 2019-07-20
Payer: MEDICAID

## 2019-07-20 LAB
ALBUMIN SERPL-MCNC: 1.3 G/DL (ref 3.4–5)
ALBUMIN/GLOB SERPL: 0.8 {RATIO} (ref 0.8–1.7)
ALP SERPL-CCNC: 78 U/L (ref 45–117)
ALT SERPL-CCNC: 6 U/L (ref 13–56)
ANION GAP SERPL CALC-SCNC: 8 MMOL/L (ref 3–18)
AST SERPL-CCNC: 9 U/L (ref 10–38)
BILIRUB SERPL-MCNC: 1 MG/DL (ref 0.2–1)
BUN SERPL-MCNC: 4 MG/DL (ref 7–18)
BUN/CREAT SERPL: 5 (ref 12–20)
CALCIUM SERPL-MCNC: 7.3 MG/DL (ref 8.5–10.1)
CHLORIDE SERPL-SCNC: 110 MMOL/L (ref 100–111)
CO2 SERPL-SCNC: 21 MMOL/L (ref 21–32)
CREAT SERPL-MCNC: 0.87 MG/DL (ref 0.6–1.3)
ERYTHROCYTE [DISTWIDTH] IN BLOOD BY AUTOMATED COUNT: 21 % (ref 11.6–14.5)
FIBRINOGEN PPP-MCNC: 274 MG/DL (ref 210–451)
GLOBULIN SER CALC-MCNC: 1.6 G/DL (ref 2–4)
GLUCOSE SERPL-MCNC: 146 MG/DL (ref 74–99)
HCT VFR BLD AUTO: 22.7 % (ref 35–45)
HGB BLD-MCNC: 7.4 G/DL (ref 12–16)
INR PPP: 1.3 (ref 0.8–1.2)
MCH RBC QN AUTO: 22.7 PG (ref 24–34)
MCHC RBC AUTO-ENTMCNC: 32.6 G/DL (ref 31–37)
MCV RBC AUTO: 69.6 FL (ref 74–97)
PLATELET # BLD AUTO: 152 K/UL (ref 135–420)
POTASSIUM SERPL-SCNC: 5.1 MMOL/L (ref 3.5–5.5)
PROT SERPL-MCNC: 2.9 G/DL (ref 6.4–8.2)
PROTHROMBIN TIME: 15.9 SEC (ref 11.5–15.2)
RBC # BLD AUTO: 3.26 M/UL (ref 4.2–5.3)
SODIUM SERPL-SCNC: 139 MMOL/L (ref 136–145)
WBC # BLD AUTO: 21.3 K/UL (ref 4.6–13.2)

## 2019-07-20 PROCEDURE — 76010000391 HC C SECN FIRST 1 HR: Performed by: OBSTETRICS & GYNECOLOGY

## 2019-07-20 PROCEDURE — 76060000078 HC EPIDURAL ANESTHESIA

## 2019-07-20 PROCEDURE — 65610000006 HC RM INTENSIVE CARE

## 2019-07-20 PROCEDURE — 80053 COMPREHEN METABOLIC PANEL: CPT

## 2019-07-20 PROCEDURE — 74011250636 HC RX REV CODE- 250/636: Performed by: INTERNAL MEDICINE

## 2019-07-20 PROCEDURE — 30233N1 TRANSFUSION OF NONAUTOLOGOUS RED BLOOD CELLS INTO PERIPHERAL VEIN, PERCUTANEOUS APPROACH: ICD-10-PCS | Performed by: OBSTETRICS & GYNECOLOGY

## 2019-07-20 PROCEDURE — 75410000003 HC RECOV DEL/VAG/CSECN EA 0.5 HR

## 2019-07-20 PROCEDURE — 88307 TISSUE EXAM BY PATHOLOGIST: CPT

## 2019-07-20 PROCEDURE — 85027 COMPLETE CBC AUTOMATED: CPT

## 2019-07-20 PROCEDURE — 77030018390 HC SPNG HEMSTAT2 J&J -B: Performed by: OBSTETRICS & GYNECOLOGY

## 2019-07-20 PROCEDURE — 3E033XZ INTRODUCTION OF VASOPRESSOR INTO PERIPHERAL VEIN, PERCUTANEOUS APPROACH: ICD-10-PCS | Performed by: OBSTETRICS & GYNECOLOGY

## 2019-07-20 PROCEDURE — 74011000250 HC RX REV CODE- 250

## 2019-07-20 PROCEDURE — 74011250636 HC RX REV CODE- 250/636

## 2019-07-20 PROCEDURE — 74011000258 HC RX REV CODE- 258

## 2019-07-20 PROCEDURE — 77030013079 HC BLNKT BAIR HGGR 3M -A: Performed by: ANESTHESIOLOGY

## 2019-07-20 PROCEDURE — 75410000002 HC LABOR FEE PER 1 HR

## 2019-07-20 PROCEDURE — 0UN90ZZ RELEASE UTERUS, OPEN APPROACH: ICD-10-PCS | Performed by: OBSTETRICS & GYNECOLOGY

## 2019-07-20 PROCEDURE — 77030018836 HC SOL IRR NACL ICUM -A: Performed by: OBSTETRICS & GYNECOLOGY

## 2019-07-20 PROCEDURE — 77030002916 HC SUT ETHLN J&J -A: Performed by: OBSTETRICS & GYNECOLOGY

## 2019-07-20 PROCEDURE — 85384 FIBRINOGEN ACTIVITY: CPT

## 2019-07-20 PROCEDURE — 85610 PROTHROMBIN TIME: CPT

## 2019-07-20 PROCEDURE — 77030002933 HC SUT MCRYL J&J -A: Performed by: OBSTETRICS & GYNECOLOGY

## 2019-07-20 PROCEDURE — 74011000250 HC RX REV CODE- 250: Performed by: ANESTHESIOLOGY

## 2019-07-20 PROCEDURE — 74011250636 HC RX REV CODE- 250/636: Performed by: ADVANCED PRACTICE MIDWIFE

## 2019-07-20 PROCEDURE — 85025 COMPLETE CBC W/AUTO DIFF WBC: CPT

## 2019-07-20 PROCEDURE — 74011250637 HC RX REV CODE- 250/637

## 2019-07-20 PROCEDURE — 76010000392 HC C SECN EA ADDL 0.5 HR: Performed by: OBSTETRICS & GYNECOLOGY

## 2019-07-20 PROCEDURE — 77030011265 HC ELECTRD BLD HEX COVD -A: Performed by: OBSTETRICS & GYNECOLOGY

## 2019-07-20 PROCEDURE — 74011250636 HC RX REV CODE- 250/636: Performed by: MIDWIFE

## 2019-07-20 PROCEDURE — 74011250636 HC RX REV CODE- 250/636: Performed by: ANESTHESIOLOGY

## 2019-07-20 PROCEDURE — 65270000029 HC RM PRIVATE

## 2019-07-20 PROCEDURE — 77030007879 HC KT SPN EPDRL TELE -B: Performed by: ANESTHESIOLOGY

## 2019-07-20 PROCEDURE — 77030040361 HC SLV COMPR DVT MDII -B

## 2019-07-20 PROCEDURE — 77030020407 HC IV BLD WRMR ST 3M -A: Performed by: ANESTHESIOLOGY

## 2019-07-20 PROCEDURE — 77030010848 HC CATH INTUTR PRSS KOLB -B

## 2019-07-20 PROCEDURE — 77030034849

## 2019-07-20 PROCEDURE — P9016 RBC LEUKOCYTES REDUCED: HCPCS

## 2019-07-20 PROCEDURE — 77030031139 HC SUT VCRL2 J&J -A: Performed by: OBSTETRICS & GYNECOLOGY

## 2019-07-20 PROCEDURE — 77030032490 HC SLV COMPR SCD KNE COVD -B: Performed by: OBSTETRICS & GYNECOLOGY

## 2019-07-20 PROCEDURE — 77010033678 HC OXYGEN DAILY

## 2019-07-20 PROCEDURE — 36415 COLL VENOUS BLD VENIPUNCTURE: CPT

## 2019-07-20 PROCEDURE — 77030026918 HC ADMN ST IV BLD BD -A: Performed by: ANESTHESIOLOGY

## 2019-07-20 PROCEDURE — 76060000037 HC ANESTHESIA 3 TO 3.5 HR: Performed by: OBSTETRICS & GYNECOLOGY

## 2019-07-20 PROCEDURE — 75410000003 HC RECOV DEL/VAG/CSECN EA 0.5 HR: Performed by: OBSTETRICS & GYNECOLOGY

## 2019-07-20 PROCEDURE — 74011000258 HC RX REV CODE- 258: Performed by: MIDWIFE

## 2019-07-20 RX ORDER — NALBUPHINE HYDROCHLORIDE 10 MG/ML
5 INJECTION, SOLUTION INTRAMUSCULAR; INTRAVENOUS; SUBCUTANEOUS
Status: DISPENSED | OUTPATIENT
Start: 2019-07-20 | End: 2019-07-21

## 2019-07-20 RX ORDER — OXYTOCIN/0.9 % SODIUM CHLORIDE 20/1000 ML
125 PLASTIC BAG, INJECTION (ML) INTRAVENOUS CONTINUOUS
Status: DISCONTINUED | OUTPATIENT
Start: 2019-07-20 | End: 2019-07-21

## 2019-07-20 RX ORDER — ACETAMINOPHEN 325 MG/1
650 TABLET ORAL
Status: DISCONTINUED | OUTPATIENT
Start: 2019-07-20 | End: 2019-07-23 | Stop reason: HOSPADM

## 2019-07-20 RX ORDER — IBUPROFEN 400 MG/1
800 TABLET ORAL
Status: DISCONTINUED | OUTPATIENT
Start: 2019-07-23 | End: 2019-07-22

## 2019-07-20 RX ORDER — SODIUM CHLORIDE 0.9 % (FLUSH) 0.9 %
5-40 SYRINGE (ML) INJECTION EVERY 8 HOURS
Status: DISCONTINUED | OUTPATIENT
Start: 2019-07-20 | End: 2019-07-23 | Stop reason: HOSPADM

## 2019-07-20 RX ORDER — FENTANYL/ROPIVACAINE/NS/PF 2MCG/ML-.1
1-15 PLASTIC BAG, INJECTION (ML) EPIDURAL
Status: DISCONTINUED | OUTPATIENT
Start: 2019-07-20 | End: 2019-07-20 | Stop reason: HOSPADM

## 2019-07-20 RX ORDER — LIDOCAINE HYDROCHLORIDE AND EPINEPHRINE 20; 5 MG/ML; UG/ML
INJECTION, SOLUTION EPIDURAL; INFILTRATION; INTRACAUDAL; PERINEURAL AS NEEDED
Status: DISCONTINUED | OUTPATIENT
Start: 2019-07-20 | End: 2019-07-20 | Stop reason: HOSPADM

## 2019-07-20 RX ORDER — CLINDAMYCIN PHOSPHATE 900 MG/50ML
900 INJECTION, SOLUTION INTRAVENOUS EVERY 8 HOURS
Status: DISCONTINUED | OUTPATIENT
Start: 2019-07-20 | End: 2019-07-23

## 2019-07-20 RX ORDER — SODIUM CHLORIDE 0.9 % (FLUSH) 0.9 %
5-40 SYRINGE (ML) INJECTION AS NEEDED
Status: DISCONTINUED | OUTPATIENT
Start: 2019-07-20 | End: 2019-07-23 | Stop reason: HOSPADM

## 2019-07-20 RX ORDER — FENTANYL CITRATE 50 UG/ML
INJECTION, SOLUTION INTRAMUSCULAR; INTRAVENOUS
Status: COMPLETED
Start: 2019-07-20 | End: 2019-07-20

## 2019-07-20 RX ORDER — NALOXONE HYDROCHLORIDE 0.4 MG/ML
0.2 INJECTION, SOLUTION INTRAMUSCULAR; INTRAVENOUS; SUBCUTANEOUS
Status: ACTIVE | OUTPATIENT
Start: 2019-07-20 | End: 2019-07-21

## 2019-07-20 RX ORDER — LIDOCAINE HYDROCHLORIDE AND EPINEPHRINE 15; 5 MG/ML; UG/ML
INJECTION, SOLUTION EPIDURAL AS NEEDED
Status: DISCONTINUED | OUTPATIENT
Start: 2019-07-20 | End: 2019-07-20 | Stop reason: HOSPADM

## 2019-07-20 RX ORDER — SODIUM CHLORIDE, SODIUM LACTATE, POTASSIUM CHLORIDE, CALCIUM CHLORIDE 600; 310; 30; 20 MG/100ML; MG/100ML; MG/100ML; MG/100ML
125 INJECTION, SOLUTION INTRAVENOUS CONTINUOUS
Status: DISCONTINUED | OUTPATIENT
Start: 2019-07-20 | End: 2019-07-20

## 2019-07-20 RX ORDER — SIMETHICONE 80 MG
80 TABLET,CHEWABLE ORAL
Status: DISCONTINUED | OUTPATIENT
Start: 2019-07-20 | End: 2019-07-23 | Stop reason: HOSPADM

## 2019-07-20 RX ORDER — SODIUM CHLORIDE 9 MG/ML
250 INJECTION, SOLUTION INTRAVENOUS AS NEEDED
Status: DISCONTINUED | OUTPATIENT
Start: 2019-07-20 | End: 2019-07-21

## 2019-07-20 RX ORDER — TRISODIUM CITRATE DIHYDRATE AND CITRIC ACID MONOHYDRATE 500; 334 MG/5ML; MG/5ML
SOLUTION ORAL AS NEEDED
Status: DISCONTINUED | OUTPATIENT
Start: 2019-07-20 | End: 2019-07-20 | Stop reason: HOSPADM

## 2019-07-20 RX ORDER — SODIUM CHLORIDE 9 MG/ML
150 INJECTION, SOLUTION INTRAVENOUS CONTINUOUS
Status: DISCONTINUED | OUTPATIENT
Start: 2019-07-20 | End: 2019-07-21

## 2019-07-20 RX ORDER — LORATADINE 10 MG/1
10 TABLET ORAL DAILY PRN
Status: DISCONTINUED | OUTPATIENT
Start: 2019-07-20 | End: 2019-07-23 | Stop reason: HOSPADM

## 2019-07-20 RX ORDER — FACIAL-BODY WIPES
10 EACH TOPICAL
Status: DISCONTINUED | OUTPATIENT
Start: 2019-07-20 | End: 2019-07-23 | Stop reason: HOSPADM

## 2019-07-20 RX ORDER — SODIUM CHLORIDE 0.9 % (FLUSH) 0.9 %
5-40 SYRINGE (ML) INJECTION EVERY 8 HOURS
Status: DISCONTINUED | OUTPATIENT
Start: 2019-07-20 | End: 2019-07-20

## 2019-07-20 RX ORDER — KETOROLAC TROMETHAMINE 30 MG/ML
30 INJECTION, SOLUTION INTRAMUSCULAR; INTRAVENOUS EVERY 6 HOURS
Status: DISPENSED | OUTPATIENT
Start: 2019-07-20 | End: 2019-07-22

## 2019-07-20 RX ORDER — SODIUM CHLORIDE, SODIUM LACTATE, POTASSIUM CHLORIDE, CALCIUM CHLORIDE 600; 310; 30; 20 MG/100ML; MG/100ML; MG/100ML; MG/100ML
INJECTION, SOLUTION INTRAVENOUS
Status: DISCONTINUED | OUTPATIENT
Start: 2019-07-20 | End: 2019-07-20 | Stop reason: HOSPADM

## 2019-07-20 RX ORDER — MORPHINE SULFATE 1 MG/ML
INJECTION, SOLUTION EPIDURAL; INTRATHECAL; INTRAVENOUS AS NEEDED
Status: DISCONTINUED | OUTPATIENT
Start: 2019-07-20 | End: 2019-07-20 | Stop reason: HOSPADM

## 2019-07-20 RX ORDER — MISOPROSTOL 100 UG/1
TABLET ORAL
Status: DISCONTINUED
Start: 2019-07-20 | End: 2019-07-20 | Stop reason: WASHOUT

## 2019-07-20 RX ORDER — PHENYLEPHRINE HCL IN 0.9% NACL 30MG/250ML
PLASTIC BAG, INJECTION (ML) INTRAVENOUS
Status: DISCONTINUED | OUTPATIENT
Start: 2019-07-20 | End: 2019-07-20 | Stop reason: HOSPADM

## 2019-07-20 RX ORDER — FENTANYL CITRATE 50 UG/ML
INJECTION, SOLUTION INTRAMUSCULAR; INTRAVENOUS AS NEEDED
Status: DISCONTINUED | OUTPATIENT
Start: 2019-07-20 | End: 2019-07-20 | Stop reason: HOSPADM

## 2019-07-20 RX ORDER — OXYTOCIN/0.9 % SODIUM CHLORIDE 30/500 ML
PLASTIC BAG, INJECTION (ML) INTRAVENOUS
Status: COMPLETED
Start: 2019-07-20 | End: 2019-07-20

## 2019-07-20 RX ORDER — KETAMINE HYDROCHLORIDE 10 MG/ML
INJECTION, SOLUTION INTRAMUSCULAR; INTRAVENOUS AS NEEDED
Status: DISCONTINUED | OUTPATIENT
Start: 2019-07-20 | End: 2019-07-20 | Stop reason: HOSPADM

## 2019-07-20 RX ORDER — METOCLOPRAMIDE HYDROCHLORIDE 5 MG/ML
INJECTION INTRAMUSCULAR; INTRAVENOUS AS NEEDED
Status: DISCONTINUED | OUTPATIENT
Start: 2019-07-20 | End: 2019-07-20 | Stop reason: HOSPADM

## 2019-07-20 RX ORDER — OXYTOCIN/0.9 % SODIUM CHLORIDE 30/500 ML
0-20 PLASTIC BAG, INJECTION (ML) INTRAVENOUS
Status: DISCONTINUED | OUTPATIENT
Start: 2019-07-20 | End: 2019-07-21

## 2019-07-20 RX ORDER — FENTANYL CITRATE 50 UG/ML
100 INJECTION, SOLUTION INTRAMUSCULAR; INTRAVENOUS ONCE
Status: DISCONTINUED | OUTPATIENT
Start: 2019-07-20 | End: 2019-07-20 | Stop reason: HOSPADM

## 2019-07-20 RX ORDER — EPHEDRINE SULFATE/0.9% NACL/PF 25 MG/5 ML
SYRINGE (ML) INTRAVENOUS AS NEEDED
Status: DISCONTINUED | OUTPATIENT
Start: 2019-07-20 | End: 2019-07-20 | Stop reason: HOSPADM

## 2019-07-20 RX ORDER — OXYCODONE AND ACETAMINOPHEN 5; 325 MG/1; MG/1
1-2 TABLET ORAL
Status: DISCONTINUED | OUTPATIENT
Start: 2019-07-20 | End: 2019-07-23 | Stop reason: HOSPADM

## 2019-07-20 RX ORDER — ZOLPIDEM TARTRATE 5 MG/1
5 TABLET ORAL
Status: DISCONTINUED | OUTPATIENT
Start: 2019-07-20 | End: 2019-07-23 | Stop reason: HOSPADM

## 2019-07-20 RX ORDER — PROMETHAZINE HYDROCHLORIDE 25 MG/ML
25 INJECTION, SOLUTION INTRAMUSCULAR; INTRAVENOUS
Status: DISCONTINUED | OUTPATIENT
Start: 2019-07-20 | End: 2019-07-23 | Stop reason: HOSPADM

## 2019-07-20 RX ORDER — ONDANSETRON 2 MG/ML
INJECTION INTRAMUSCULAR; INTRAVENOUS AS NEEDED
Status: DISCONTINUED | OUTPATIENT
Start: 2019-07-20 | End: 2019-07-20 | Stop reason: HOSPADM

## 2019-07-20 RX ORDER — MIDAZOLAM HYDROCHLORIDE 1 MG/ML
INJECTION, SOLUTION INTRAMUSCULAR; INTRAVENOUS AS NEEDED
Status: DISCONTINUED | OUTPATIENT
Start: 2019-07-20 | End: 2019-07-20 | Stop reason: HOSPADM

## 2019-07-20 RX ORDER — LIDOCAINE HYDROCHLORIDE AND EPINEPHRINE 20; 5 MG/ML; UG/ML
INJECTION, SOLUTION EPIDURAL; INFILTRATION; INTRACAUDAL; PERINEURAL AS NEEDED
Status: DISCONTINUED | OUTPATIENT
Start: 2019-07-20 | End: 2019-07-20

## 2019-07-20 RX ORDER — TRANEXAMIC ACID 100 MG/ML
INJECTION, SOLUTION INTRAVENOUS
Status: DISCONTINUED
Start: 2019-07-20 | End: 2019-07-20 | Stop reason: WASHOUT

## 2019-07-20 RX ORDER — ROPIVACAINE HYDROCHLORIDE 2 MG/ML
INJECTION, SOLUTION EPIDURAL; INFILTRATION; PERINEURAL AS NEEDED
Status: DISCONTINUED | OUTPATIENT
Start: 2019-07-20 | End: 2019-07-20 | Stop reason: HOSPADM

## 2019-07-20 RX ORDER — KETOROLAC TROMETHAMINE 30 MG/ML
30 INJECTION, SOLUTION INTRAMUSCULAR; INTRAVENOUS
Status: DISPENSED | OUTPATIENT
Start: 2019-07-20 | End: 2019-07-22

## 2019-07-20 RX ORDER — SODIUM CHLORIDE 9 MG/ML
INJECTION, SOLUTION INTRAVENOUS
Status: DISCONTINUED | OUTPATIENT
Start: 2019-07-20 | End: 2019-07-20 | Stop reason: HOSPADM

## 2019-07-20 RX ORDER — FENTANYL/ROPIVACAINE/NS/PF 2MCG/ML-.1
PLASTIC BAG, INJECTION (ML) EPIDURAL
Status: COMPLETED
Start: 2019-07-20 | End: 2019-07-20

## 2019-07-20 RX ORDER — NALOXONE HYDROCHLORIDE 0.4 MG/ML
0.2 INJECTION, SOLUTION INTRAMUSCULAR; INTRAVENOUS; SUBCUTANEOUS AS NEEDED
Status: DISCONTINUED | OUTPATIENT
Start: 2019-07-20 | End: 2019-07-20 | Stop reason: HOSPADM

## 2019-07-20 RX ORDER — OXYTOCIN 10 [USP'U]/ML
INJECTION, SOLUTION INTRAMUSCULAR; INTRAVENOUS AS NEEDED
Status: DISCONTINUED | OUTPATIENT
Start: 2019-07-20 | End: 2019-07-20 | Stop reason: HOSPADM

## 2019-07-20 RX ORDER — CARBOPROST TROMETHAMINE 250 UG/ML
INJECTION, SOLUTION INTRAMUSCULAR
Status: DISCONTINUED
Start: 2019-07-20 | End: 2019-07-20 | Stop reason: WASHOUT

## 2019-07-20 RX ORDER — CLINDAMYCIN PHOSPHATE 900 MG/50ML
900 INJECTION, SOLUTION INTRAVENOUS ONCE
Status: COMPLETED | OUTPATIENT
Start: 2019-07-20 | End: 2019-07-20

## 2019-07-20 RX ORDER — DIPHENHYDRAMINE HYDROCHLORIDE 50 MG/ML
12.5 INJECTION, SOLUTION INTRAMUSCULAR; INTRAVENOUS
Status: DISCONTINUED | OUTPATIENT
Start: 2019-07-20 | End: 2019-07-20 | Stop reason: HOSPADM

## 2019-07-20 RX ORDER — METHYLERGONOVINE MALEATE 0.2 MG/ML
INJECTION INTRAVENOUS
Status: DISCONTINUED
Start: 2019-07-20 | End: 2019-07-20 | Stop reason: WASHOUT

## 2019-07-20 RX ADMIN — MIDAZOLAM HYDROCHLORIDE 2 MG: 1 INJECTION, SOLUTION INTRAMUSCULAR; INTRAVENOUS at 16:55

## 2019-07-20 RX ADMIN — LIDOCAINE HYDROCHLORIDE AND EPINEPHRINE 3 ML: 15; 5 INJECTION, SOLUTION EPIDURAL at 05:39

## 2019-07-20 RX ADMIN — MIDAZOLAM HYDROCHLORIDE 2 MG: 1 INJECTION, SOLUTION INTRAMUSCULAR; INTRAVENOUS at 15:08

## 2019-07-20 RX ADMIN — GENTAMICIN SULFATE 530 MG: 40 INJECTION, SOLUTION INTRAMUSCULAR; INTRAVENOUS at 14:28

## 2019-07-20 RX ADMIN — OXYTOCIN 20 UNITS: 10 INJECTION, SOLUTION INTRAMUSCULAR; INTRAVENOUS at 15:07

## 2019-07-20 RX ADMIN — ROPIVACAINE HYDROCHLORIDE 12 ML/HR: 10 INJECTION, SOLUTION EPIDURAL at 05:42

## 2019-07-20 RX ADMIN — SODIUM CHLORIDE, SODIUM LACTATE, POTASSIUM CHLORIDE, AND CALCIUM CHLORIDE 125 ML/HR: 600; 310; 30; 20 INJECTION, SOLUTION INTRAVENOUS at 18:35

## 2019-07-20 RX ADMIN — MIDAZOLAM HYDROCHLORIDE 2 MG: 1 INJECTION, SOLUTION INTRAMUSCULAR; INTRAVENOUS at 16:04

## 2019-07-20 RX ADMIN — KETOROLAC TROMETHAMINE 30 MG: 30 INJECTION, SOLUTION INTRAMUSCULAR at 18:52

## 2019-07-20 RX ADMIN — SODIUM CHLORIDE 150 ML/HR: 900 INJECTION, SOLUTION INTRAVENOUS at 21:17

## 2019-07-20 RX ADMIN — Medication 10 MG: at 14:28

## 2019-07-20 RX ADMIN — Medication 1 MILLI-UNITS/MIN: at 09:03

## 2019-07-20 RX ADMIN — SODIUM CHLORIDE, SODIUM LACTATE, POTASSIUM CHLORIDE, AND CALCIUM CHLORIDE: 600; 310; 30; 20 INJECTION, SOLUTION INTRAVENOUS at 15:07

## 2019-07-20 RX ADMIN — SODIUM CHLORIDE: 9 INJECTION, SOLUTION INTRAVENOUS at 16:13

## 2019-07-20 RX ADMIN — ONDANSETRON 4 MG: 2 INJECTION INTRAMUSCULAR; INTRAVENOUS at 15:08

## 2019-07-20 RX ADMIN — MORPHINE SULFATE 2 MG: 1 INJECTION, SOLUTION EPIDURAL; INTRATHECAL; INTRAVENOUS at 15:20

## 2019-07-20 RX ADMIN — Medication 125 ML/HR: at 18:36

## 2019-07-20 RX ADMIN — SODIUM CHLORIDE, SODIUM LACTATE, POTASSIUM CHLORIDE, AND CALCIUM CHLORIDE: 600; 310; 30; 20 INJECTION, SOLUTION INTRAVENOUS at 17:33

## 2019-07-20 RX ADMIN — SODIUM CHLORIDE: 9 INJECTION, SOLUTION INTRAVENOUS at 15:43

## 2019-07-20 RX ADMIN — SODIUM CHLORIDE, SODIUM LACTATE, POTASSIUM CHLORIDE, AND CALCIUM CHLORIDE: 600; 310; 30; 20 INJECTION, SOLUTION INTRAVENOUS at 16:00

## 2019-07-20 RX ADMIN — LIDOCAINE HYDROCHLORIDE AND EPINEPHRINE 10 ML: 20; 5 INJECTION, SOLUTION EPIDURAL; INFILTRATION; INTRACAUDAL; PERINEURAL at 14:20

## 2019-07-20 RX ADMIN — SODIUM CHLORIDE, SODIUM LACTATE, POTASSIUM CHLORIDE, CALCIUM CHLORIDE: 600; 310; 30; 20 INJECTION, SOLUTION INTRAVENOUS at 15:42

## 2019-07-20 RX ADMIN — KETAMINE HYDROCHLORIDE 10 MG: 10 INJECTION, SOLUTION INTRAMUSCULAR; INTRAVENOUS at 16:57

## 2019-07-20 RX ADMIN — METOCLOPRAMIDE HYDROCHLORIDE 10 MG: 5 INJECTION INTRAMUSCULAR; INTRAVENOUS at 15:08

## 2019-07-20 RX ADMIN — FENTANYL CITRATE 100 MCG: 50 INJECTION, SOLUTION INTRAMUSCULAR; INTRAVENOUS at 15:08

## 2019-07-20 RX ADMIN — TRISODIUM CITRATE DIHYDRATE AND CITRIC ACID MONOHYDRATE 30 ML: 500; 334 SOLUTION ORAL at 14:20

## 2019-07-20 RX ADMIN — BUTORPHANOL TARTRATE 2 MG: 2 INJECTION, SOLUTION INTRAMUSCULAR; INTRAVENOUS at 01:46

## 2019-07-20 RX ADMIN — SODIUM CHLORIDE, SODIUM LACTATE, POTASSIUM CHLORIDE, AND CALCIUM CHLORIDE: 600; 310; 30; 20 INJECTION, SOLUTION INTRAVENOUS at 17:03

## 2019-07-20 RX ADMIN — CLINDAMYCIN PHOSPHATE 900 MG: 900 INJECTION, SOLUTION INTRAVENOUS at 14:19

## 2019-07-20 RX ADMIN — ROPIVACAINE HYDROCHLORIDE 6 ML: 2 INJECTION, SOLUTION EPIDURAL; INFILTRATION; PERINEURAL at 13:01

## 2019-07-20 RX ADMIN — ROPIVACAINE HYDROCHLORIDE 3 ML: 2 INJECTION, SOLUTION EPIDURAL; INFILTRATION; PERINEURAL at 05:43

## 2019-07-20 RX ADMIN — ROPIVACAINE HYDROCHLORIDE 4 ML: 2 INJECTION, SOLUTION EPIDURAL; INFILTRATION; PERINEURAL at 05:44

## 2019-07-20 RX ADMIN — ROPIVACAINE HYDROCHLORIDE 15 ML/HR: 10 INJECTION, SOLUTION EPIDURAL at 10:49

## 2019-07-20 RX ADMIN — SODIUM CHLORIDE, SODIUM LACTATE, POTASSIUM CHLORIDE, AND CALCIUM CHLORIDE 125 ML/HR: 600; 310; 30; 20 INJECTION, SOLUTION INTRAVENOUS at 09:19

## 2019-07-20 RX ADMIN — KETAMINE HYDROCHLORIDE 20 MG: 10 INJECTION, SOLUTION INTRAMUSCULAR; INTRAVENOUS at 16:55

## 2019-07-20 RX ADMIN — FENTANYL CITRATE 100 MCG: 50 INJECTION, SOLUTION INTRAMUSCULAR; INTRAVENOUS at 14:20

## 2019-07-20 RX ADMIN — OXYTOCIN 20 UNITS: 10 INJECTION, SOLUTION INTRAMUSCULAR; INTRAVENOUS at 15:42

## 2019-07-20 RX ADMIN — LIDOCAINE HYDROCHLORIDE AND EPINEPHRINE 10 ML: 20; 5 INJECTION, SOLUTION EPIDURAL; INFILTRATION; INTRACAUDAL; PERINEURAL at 14:10

## 2019-07-20 RX ADMIN — ROPIVACAINE HYDROCHLORIDE 3 ML: 2 INJECTION, SOLUTION EPIDURAL; INFILTRATION; PERINEURAL at 05:42

## 2019-07-20 RX ADMIN — Medication 40 MCG/MIN: at 15:16

## 2019-07-20 RX ADMIN — SODIUM CHLORIDE, SODIUM LACTATE, POTASSIUM CHLORIDE, CALCIUM CHLORIDE: 600; 310; 30; 20 INJECTION, SOLUTION INTRAVENOUS at 15:27

## 2019-07-20 RX ADMIN — ONDANSETRON 4 MG: 2 INJECTION INTRAMUSCULAR; INTRAVENOUS at 08:01

## 2019-07-20 RX ADMIN — FENTANYL CITRATE 100 MCG: 50 INJECTION, SOLUTION INTRAMUSCULAR; INTRAVENOUS at 15:04

## 2019-07-20 RX ADMIN — KETAMINE HYDROCHLORIDE 10 MG: 10 INJECTION, SOLUTION INTRAMUSCULAR; INTRAVENOUS at 16:59

## 2019-07-20 RX ADMIN — FENTANYL CITRATE 100 MCG: 50 INJECTION, SOLUTION INTRAMUSCULAR; INTRAVENOUS at 05:44

## 2019-07-20 RX ADMIN — OXYTOCIN 20 UNITS: 10 INJECTION, SOLUTION INTRAMUSCULAR; INTRAVENOUS at 15:27

## 2019-07-20 RX ADMIN — SODIUM CHLORIDE 500 ML: 900 INJECTION, SOLUTION INTRAVENOUS at 21:18

## 2019-07-20 NOTE — PROGRESS NOTES
Labor Progress Note  Patient seen, fetal heart rate and contraction pattern evaluated, patient examined. No data found.     Physical Exam:  Cervical Exam:  5 cm dilated    70% effaced    -2 station    Presenting Part: cephalic  Membranes:  leaking mec stained fluid  Uterine Activity: Frequency: Every 2-3 minutes, Duration: 60 seconds and Intensity: moderate  Fetal Heart Rate: Baseline: 130 per minute  Variability: moderate  Accelerations: yes  Decelerations: none    Assessment/Plan:  Reassuring fetal status, Labor  Progressing normally, Continue plan for vaginal delivery

## 2019-07-20 NOTE — PROGRESS NOTES
Labor Progress Note  Patient seen, fetal heart rate and contraction pattern evaluated, patient examined. No data found.     Physical Exam:  Cervical Exam:  9 cm dilated    100% effaced    0 station    Membranes:  thick mec  Uterine Activity: Frequency: Every 2 minutes, Duration: 60 seconds and Intensity: strong  Fetal Heart Rate: Baseline: 145 per minute  Variability: moderate  Accelerations: yes  Decelerations: variable    Assessment/Plan:  Reassuring fetal status, Labor  Not progressing normally  continue pitocin augmentation  titrating dosage safely, Continue plan for vaginal delivery

## 2019-07-20 NOTE — ANESTHESIA POSTPROCEDURE EVALUATION
Post-Anesthesia Evaluation and Assessment    Cardiovascular Function/Vital Signs  Visit Vitals  /65   Pulse (!) 131   Temp (!) 38.3 °C (100.9 °F)   Resp 27   Ht 5' 4\" (1.626 m)   Wt 106.1 kg (234 lb)   SpO2 95%   Breastfeeding? Unknown   BMI 40.17 kg/m²       Patient is status post Procedure(s):   SECTION. Nausea/Vomiting: Controlled. Postoperative hydration reviewed and adequate. Pain:  Pain Scale 1: Numeric (0 - 10) (19)  Pain Intensity 1: 0 (19)   Managed. Neurological Status:   Neuro (WDL): Within Defined Limits (19)   At baseline. Mental Status and Level of Consciousness: Arousable. Pulmonary Status:   O2 Device: Room air (19 1747)   Adequate oxygenation and airway patent. Complications related to anesthesia: None    Post-anesthesia assessment completed. No concerns. Patient has met all discharge requirements.     Signed By: Arias Vela CRNA    2019

## 2019-07-20 NOTE — PERIOP NOTES
TRANSFER - IN REPORT:    Verbal report received from April CRNA, (name) on Jt Handley  being received from OR (unit) for routine progression of care      Report consisted of patients Situation, Background, Assessment and   Recommendations(SBAR). Information from the following report(s) OR Summary, Procedure Summary, Intake/Output and MAR was reviewed with the receiving nurse. Opportunity for questions and clarification was provided. Assessment completed upon patients arrival to unit and care assumed.

## 2019-07-20 NOTE — PROGRESS NOTES
Problem: Patient Education: Go to Patient Education Activity  Goal: Patient/Family Education  7/19/2019 2003 by Chacha Patiño RN  Outcome: Progressing Towards Goal  7/19/2019 1952 by Chacha Patiño RN  Outcome: Progressing Towards Goal     Problem: Vaginal Delivery: Day of Deliver-Laboring  Goal: Activity/Safety  7/19/2019 2003 by Chacha Patiño RN  Outcome: Progressing Towards Goal  7/19/2019 1952 by Chacha Patiño RN  Outcome: Progressing Towards Goal  Goal: Consults, if ordered  7/19/2019 2003 by Chacha Patiño RN  Outcome: Progressing Towards Goal  7/19/2019 1952 by Chacha Patiño RN  Outcome: Progressing Towards Goal  Goal: Diagnostic Test/Procedures  7/19/2019 2003 by Chacha Patiño RN  Outcome: Progressing Towards Goal  7/19/2019 1952 by Chacha Patiño RN  Outcome: Progressing Towards Goal  Goal: Nutrition/Diet  7/19/2019 2003 by Chacha Patiño RN  Outcome: Progressing Towards Goal  7/19/2019 1952 by Chacha Patiño RN  Outcome: Progressing Towards Goal  Goal: Discharge Planning  7/19/2019 2003 by Chacha Patiño RN  Outcome: Progressing Towards Goal  7/19/2019 1952 by Chacha Patiño RN  Outcome: Progressing Towards Goal  Goal: Medications  7/19/2019 2003 by Chacha Patiño, RN  Outcome: Progressing Towards Goal  7/19/2019 1952 by Chacha Patiño, RN  Outcome: Progressing Towards Goal  Goal: Respiratory  7/19/2019 2003 by Chacha Patiño, RN  Outcome: Progressing Towards Goal  7/19/2019 1952 by Chacha Patiño RN  Outcome: Progressing Towards Goal  Goal: Treatments/Interventions/Procedures  7/19/2019 2003 by Chacha Patiño, RN  Outcome: Progressing Towards Goal  7/19/2019 1952 by Chacha Patiño RN  Outcome: Progressing Towards Goal  Goal: *Vital signs within defined limits  7/19/2019 2003 by Chacha Patiño, RN  Outcome: Progressing Towards Goal  7/19/2019 1952 by Chacha Patiño RN  Outcome: Progressing Towards Goal  Goal: *Labs within defined limits  7/19/2019 2003 by Ruben Díaz RN  Outcome: Progressing Towards Goal  7/19/2019 1952 by Ruben Díaz RN  Outcome: Progressing Towards Goal  Goal: *Hemodynamically stable  7/19/2019 2003 by Ruben Díaz RN  Outcome: Progressing Towards Goal  7/19/2019 1952 by Ruben Díaz RN  Outcome: Progressing Towards Goal  Goal: *Optimal pain control at patient's stated goal  7/19/2019 2003 by Ruben Díaz RN  Outcome: Progressing Towards Goal  7/19/2019 1952 by Ruben Díaz RN  Outcome: Progressing Towards Goal     Problem: Vaginal Delivery: Day of Delivery-Post delivery  Goal: Activity/Safety  7/19/2019 2003 by Ruben Díaz RN  Outcome: Progressing Towards Goal  7/19/2019 1952 by Ruben Díaz RN  Outcome: Progressing Towards Goal  Goal: Consults, if ordered  7/19/2019 2003 by Ruben Díaz RN  Outcome: Progressing Towards Goal  7/19/2019 1952 by Ruben Díaz RN  Outcome: Progressing Towards Goal  Goal: Nutrition/Diet  7/19/2019 2003 by Ruben Díaz RN  Outcome: Progressing Towards Goal  7/19/2019 1952 by Ruben Díaz RN  Outcome: Progressing Towards Goal  Goal: Discharge Planning  7/19/2019 2003 by Ruben Díaz RN  Outcome: Progressing Towards Goal  7/19/2019 1952 by Ruben Díaz RN  Outcome: Progressing Towards Goal  Goal: Medications  7/19/2019 2003 by Ruben Díaz RN  Outcome: Progressing Towards Goal  7/19/2019 1952 by Ruben Díaz RN  Outcome: Progressing Towards Goal  Goal: Treatments/Interventions/Procedures  7/19/2019 2003 by Ruben Díaz RN  Outcome: Progressing Towards Goal  7/19/2019 1952 by Ruben Díaz RN  Outcome: Progressing Towards Goal  Goal: *Vital signs within defined limits  7/19/2019 2003 by Ruben Díaz RN  Outcome: Progressing Towards Goal  7/19/2019 1952 by Atif Oliveira RN  Outcome: Progressing Towards Goal  Goal: *Labs within defined limits  7/19/2019 2003 by Atif Oliveira RN  Outcome: Progressing Towards Goal  7/19/2019 1952 by Atif Oliveira RN  Outcome: Progressing Towards Goal  Goal: *Hemodynamically stable  7/19/2019 2003 by Atif Oliveira RN  Outcome: Progressing Towards Goal  7/19/2019 1952 by Atif Oliveira RN  Outcome: Progressing Towards Goal  Goal: *Optimal pain control at patient's stated goal  7/19/2019 2003 by Atif Oliveira RN  Outcome: Progressing Towards Goal  7/19/2019 1952 by Atif Oliveira RN  Outcome: Progressing Towards Goal  Goal: *Participates in infant care  7/19/2019 2003 by Atif Oliveira RN  Outcome: Progressing Towards Goal  7/19/2019 1952 by Atif Oliveira RN  Outcome: Progressing Towards Goal  Goal: *Demonstrates progressive activity  7/19/2019 2003 by Atif Oliveira RN  Outcome: Progressing Towards Goal  7/19/2019 1952 by Atif Oliveira RN  Outcome: Progressing Towards Goal  Goal: *Tolerating diet  7/19/2019 2003 by Atif Oliveira RN  Outcome: Progressing Towards Goal  7/19/2019 1952 by Atif Oliveira RN  Outcome: Progressing Towards Goal     Problem: Pain  Goal: *PALLIATIVE CARE:  Alleviation of Pain  7/19/2019 2003 by Atif Oliveira RN  Outcome: Progressing Towards Goal  7/19/2019 1952 by Atif Oliveira RN  Outcome: Progressing Towards Goal     Problem: Patient Education: Go to Patient Education Activity  Goal: Patient/Family Education  7/19/2019 2003 by Atif Oliveira RN  Outcome: Progressing Towards Goal  7/19/2019 1952 by Atif Oliveira RN  Outcome: Progressing Towards Goal

## 2019-07-20 NOTE — PROGRESS NOTES
In ICU with patient for Q15 fundal/lochia checks. SBAR received from Abril Rajput RN. Pt resting in bed, family at bedside. Explained hospital protocol of baby bands, someone with a band needs to be in room with infant at all times unless she is with nurse or in nursery. Pt and family verbalized understanding. Pt's mother with 4th band and states she will be staying the night with the baby. 1948- Fundal @U, firm, scant bleeding. Dressing CDI, some drainage from perineum noted on lower part of dressing. 0030- Set patient up with electric breast pump. Educated on use, pumping frequency and storage. Mom initiated pumping at this time. No questions or concerns. 0400- Fundus firm @U, scant bleeding. Abd. dressing with drainage on outside from perineum. Solis/mau care performed and pad changed per request. Updated on infant's status and given NICU number to call as desired. Mother verbalized understanding. No other needs from this RN.

## 2019-07-20 NOTE — PROGRESS NOTES
OB Labor Note    Assessment:   Failure to progress despite pitocin augmentation and adequate contractions. Failed     Plan: Will proceed with  section. Risks discussed. Procedure discussed in detail. All questions answered. Pt agrees with the plan - desires  section. consent obtained. Maternal fever - start gent and clindamycin. Pt is penicillin allergic    Subjective:   Pt. does not have any complaints. Pt. is feeling contractions. Pt. is feeling fetal movement. Objective:     Visit Vitals  /57   Pulse (!) 114   Temp (!) 100.6 °F (38.1 °C)   Resp 16   Ht 5' 4\" (1.626 m)   Wt 106.1 kg (234 lb)   SpO2 98%   BMI 40.17 kg/m²      Cervical Exam  Dilation (cm): 10  Eff: 100 %  Station: 0  Position: Anterior  Cervical Consistency: Soft  Vaginal exam done by? : Scott Li CNM  Membrane Status: SROM  Dilation Complete Date: 19  Dilation Time Complete: 1302     Patient Vitals for the past 4 hrs: Mode Fetal Heart Rate Variability Decelerations Accelerations RN Reviewed Strip?  FHR Interventions   19 1302       IV Fluid Bolus   19 1300 Internal 150 6-25 BPM Early No Yes    19 1245 Internal 150 (!) Less than or equal to 5 BPM Early No Yes    19 1230 Internal 150 (!) Less than or equal to 5 BPM Early No Yes    19 1215 Internal 145 6-25 BPM Variable Yes Yes    19 1200 Internal 145 (!) Less than or equal to 5 BPM Early Yes Yes    19 1145 Internal 145 (!) Less than or equal to 5 BPM Early No Yes    19 1045 External     Yes FSE Applied   19 1041 US Readjusted         19 1037 US Readjusted  [employer]      19 1003       Other (comment)        Soha Ca MD  2019 1:38 PM

## 2019-07-20 NOTE — ADVANCED PRACTICE NURSE
Epidural bolused with Naropin 0.2% 6 mL after negative aspirations. Vital signs stable. Fetal heart rate stable. With postive relief.

## 2019-07-20 NOTE — ANESTHESIA PREPROCEDURE EVALUATION
Relevant Problems   No relevant active problems       Anesthetic History   No history of anesthetic complications            Review of Systems / Medical History  Patient summary reviewed, nursing notes reviewed and pertinent labs reviewed    Pulmonary  Within defined limits                 Neuro/Psych   Within defined limits           Cardiovascular  Within defined limits                Exercise tolerance: >4 METS     GI/Hepatic/Renal     GERD           Endo/Other  Within defined limits           Other Findings            Physical Exam    Airway  Mallampati: III  TM Distance: 4 - 6 cm  Neck ROM: normal range of motion   Mouth opening: Normal     Cardiovascular               Dental         Pulmonary                 Abdominal         Other Findings            Anesthetic Plan    ASA: 2  Anesthesia type: epidural            Anesthetic plan and risks discussed with: Patient      Risks of epidural, including but not limited to bleeding, infection, back pain, headache, seizure, nerve injury, and block failure discussed with patient and accepted.

## 2019-07-20 NOTE — PROGRESS NOTES
Rounding on pt- pt requesting and given ice chips, family at bedside. Pt expresses no needs at this time- is going\" natural\". Given popsicle

## 2019-07-20 NOTE — PERIOP NOTES
TRANSFER - OUT REPORT:    Verbal report given to Regine RN (name) on Raine Pereyra  being transferred to ICU (unit) for routine progression of care       Report consisted of patients Situation, Background, Assessment and   Recommendations(SBAR). Information from the following report(s) OR Summary, Procedure Summary, Intake/Output and MAR was reviewed with the receiving nurse. Lines:   Peripheral IV 07/20/19 Anterior; Left Forearm (Active)   Site Assessment Clean, dry, & intact 7/20/2019  8:05 AM   Phlebitis Assessment 0 7/20/2019  8:05 AM   Infiltration Assessment 0 7/20/2019  8:05 AM   Dressing Status Clean, dry, & intact; Occlusive 7/20/2019  8:05 AM   Dressing Type Transparent;Tape 7/20/2019  8:05 AM   Hub Color/Line Status Pink 7/20/2019  8:05 AM        Intake/Output Summary (Last 24 hours) at 7/20/2019 1820  Last data filed at 7/20/2019 1734  Gross per 24 hour   Intake 9360 ml   Output 4400 ml   Net 4960 ml       Opportunity for questions and clarification was provided.       Patient transported with:   Registered Nurse

## 2019-07-20 NOTE — PROGRESS NOTES
Received direct from L&D OR w/out getting report. Initial assessment completed. AAOX4. No c/o pain. On RA w/out sob. Monitor shows ST w/ rate high 120's w/out ectopy. L& D nurse checking the Fundus. Rest of the orders Acknowledged. Dr Maureen Dodge visited w/ new orders. Lab ordees drawn. 1830- Pitocin drip started as ordered. 200- Notified Dr Maureen Dodge of lab results w/out new order except to repeat cbc @ 0400. Clear liquid diet started. Family visited. 1930-Bedside and Verbal shift change report given to [address] [place] [address] (oncoming nurse) by  Ember Aguilar RN (offgoing nurse).  Report included the following information SBAR, OR Summary, Intake/Output, MAR, Recent Results and Cardiac Rhythm st.

## 2019-07-20 NOTE — ANESTHESIA PROCEDURE NOTES
Epidural Block    Start time: 7/20/2019 5:30 AM  Performed by: Selma Palacio MD  Authorized by: Selma Palacio MD     Pre-Procedure  Indication: labor epidural    Preanesthetic Checklist: patient identified, risks and benefits discussed, anesthesia consent, site marked, patient being monitored, timeout performed and anesthesia consent    Timeout Time: 05:35        Epidural:   Patient position:  Seated  Prep region:  Lumbar  Prep: Chlorhexidine    Location:  L3-4    Needle and Epidural Catheter:   Needle Type:  Tuohy  Needle Gauge:  17 G  Injection Technique:  Loss of resistance using saline  Attempts:  1  Catheter Size:  19 G  Catheter at Skin Depth (cm):  13  Depth in Epidural Space (cm):  6  Events: no blood with aspiration, no cerebrospinal fluid with aspiration, no paresthesia and negative aspiration test    Test Dose:  Negative    Assessment:   Catheter Secured:  Tegaderm and tape  Insertion:  Uncomplicated  Patient tolerance:  Patient tolerated the procedure well with no immediate complications

## 2019-07-20 NOTE — PROGRESS NOTES
0725 Bedside and Verbal shift change report given to Norman Carranza RN (oncoming nurse) by Ian Garcia RN (offgoing nurse). Report included the following information SBAR, Kardex, Intake/Output, MAR and Recent Results. 1302 Nubia Kaur CRNA at bedside administering epidural bolus, since patient painful on right side    1325  Section called for arrest of labor. 5 Dr. Priyanka Lange at bedside discussing anesthesia for  Section with patient. 1419 Patient in O.R. Room 2 for  Section    1742 Patient left O.R. Room 2 for ICU bed 5.     1747 Patient arrived in Intensive Care bed number 5 from L/D O.R. Room 2.    0117 Patient with scant vaginal lochia postpartum, uterine fundus remains firm at umbilicus. 1927 Bedside and Verbal shift change report given to Ana Luisa Ruiz RN (oncoming nurse) by Norman Carranza RN (offgoing nurse). Report included the following information SBAR, Kardex and Procedure Summary, fundal exams and vaginal lochia.

## 2019-07-20 NOTE — PROGRESS NOTES
Patient is comfortable with epidural. Progressing in labor. Vitals stable. Expecting vaginal delivery at this time.

## 2019-07-20 NOTE — PROGRESS NOTES
1930:  Assumed care of pt from Jeremi Lamb RN. Pt in room with PETROS Winters CNM. Pt allowed to ambulated in holly with wireless ultrasound and toco.  Pt plans to labor naturally. Plan of care discussed with pt. Pt expressed understanding    [phone]:  Pt request epidural.  Bolus started. Anesthesia paged    0530:  Pt up to RR. Dr. Louisa Gitelman bedside discussing epidural. Risk and benefits discussed with pt. Pt wished to proceed. Confirmation of signed consent form. Pt positioned at side of bed for procedure.      0535:  Timeout performed.      0538:  Catheter placed.      0539:  Test dose administered.      0541:  Loading dose administered. Fentanyl vial given to Audeh to be administered through epidural.  Pt lying back in bed. TOCO and US adjusted.    PCA pump started. 4333: PETROS Todd CNM at bedside.   IUPC placed

## 2019-07-20 NOTE — BRIEF OP NOTE
BRIEF OPERATIVE NOTE    Date of Procedure: 2019   Preoperative Diagnosis: Arrest of labor, obesity  Postoperative Diagnosis: Arrest of labor, obesity, Postpartum hemorrhage due to extension to the left parametrium    Procedure(s):   SECTION  Lysis of adhesions  Surgeon(s) and Role:     * Lucas Pineda MD - Primary         Surgical Assistant: Azael Rudd    Surgical Staff:  Circ-1: Jazzmine Eisenbergub Tech-2: Xiomy Cummings  Event Time In Time Out   Incision Start 1434    Incision Close       Anesthesia: Epidural   Estimated Blood Loss: 4000ml  Specimens: Placenta  Findings: extensive adhesions of the abdominal wall to the anterior uterine wall and bladder. Extension of the transverse uterine incision down and into the left parametrium. Large venous plexus on the posterior bladder wall. Multiple sutures placed to obtain hemostasis. Surgicel placed over the anterior uterine wall. Live female infant APGARS  6/7/9 Wt 3680 grams  Thick meconium stained fluid    Complications: postpartum hemorrhage.   Implants: None

## 2019-07-20 NOTE — PROGRESS NOTES
Labor Progress Note  Patient seen, fetal heart rate and contraction pattern evaluated, patient examined. No data found.     Physical Exam:  Cervical Exam:  8 cm dilated    90% effaced    0 station    Presenting Part: cephalic  Cervical Position: mid position  Membranes:  Intact  Uterine Activity: Frequency: Every 3-5 minutes, Duration: 80 seconds and Intensity: moderate  Fetal Heart Rate: Baseline: 140 per minute  Variability: moderate  Accelerations: yes  Decelerations: none  Uterine contractions: regular, every 3-4 minutes    Assessment/Plan:  Reassuring fetal status, Labor  Progressing normally, Continue plan for vaginal delivery

## 2019-07-20 NOTE — PROGRESS NOTES
Labor Progress Note  Patient seen, fetal heart rate and contraction pattern evaluated, patient examined. No data found. Physical Exam:  Cervical Exam:  7 cm dilated    100% effaced    -2 station    Membranes:  thick mec on exam  Uterine Activity: Frequency: Every 2-3 minutes, Duration: 30-60 seconds and Intensity: moderate  Fetal Heart Rate: Baseline: 135 per minute  Variability: moderate  Accelerations: yes  Decelerations: variable    Assessment/Plan:  Reassuring fetal status, Labor  Not progressing normally  start pitocin augmentation  titrating dosage safely, Continue plan for vaginal delivery   Discuss POC with Dr Zi Camarillo.  Aware and available for delivery    [facility]

## 2019-07-20 NOTE — PROGRESS NOTES
Bedside and Verbal shift change report given to Ramesh Granda RN. (oncoming nurse) by GORDY Chin RN (offgoing nurse).  Report included the following information SBAR, Kardex, Intake/Output, MAR, Recent Results and Med Rec

## 2019-07-21 PROBLEM — R58 HYPOTENSION DUE TO BLOOD LOSS: Status: ACTIVE | Noted: 2019-07-21

## 2019-07-21 PROBLEM — I95.89 HYPOTENSION DUE TO BLOOD LOSS: Status: ACTIVE | Noted: 2019-07-21

## 2019-07-21 PROBLEM — D64.9 ANEMIA: Status: ACTIVE | Noted: 2019-07-21

## 2019-07-21 LAB
ANION GAP SERPL CALC-SCNC: 6 MMOL/L (ref 3–18)
ANION GAP SERPL CALC-SCNC: 7 MMOL/L (ref 3–18)
BASOPHILS # BLD: 0 K/UL (ref 0–0.1)
BASOPHILS NFR BLD: 0 % (ref 0–3)
BUN SERPL-MCNC: 6 MG/DL (ref 7–18)
BUN SERPL-MCNC: 6 MG/DL (ref 7–18)
BUN/CREAT SERPL: 7 (ref 12–20)
BUN/CREAT SERPL: 8 (ref 12–20)
CALCIUM SERPL-MCNC: 7.3 MG/DL (ref 8.5–10.1)
CALCIUM SERPL-MCNC: 7.7 MG/DL (ref 8.5–10.1)
CHLORIDE SERPL-SCNC: 110 MMOL/L (ref 100–111)
CHLORIDE SERPL-SCNC: 111 MMOL/L (ref 100–111)
CO2 SERPL-SCNC: 21 MMOL/L (ref 21–32)
CO2 SERPL-SCNC: 23 MMOL/L (ref 21–32)
CREAT SERPL-MCNC: 0.72 MG/DL (ref 0.6–1.3)
CREAT SERPL-MCNC: 0.84 MG/DL (ref 0.6–1.3)
DIFFERENTIAL METHOD BLD: ABNORMAL
EOSINOPHIL # BLD: 0 K/UL (ref 0–0.4)
EOSINOPHIL NFR BLD: 0 % (ref 0–5)
ERYTHROCYTE [DISTWIDTH] IN BLOOD BY AUTOMATED COUNT: 20.6 % (ref 11.6–14.5)
ERYTHROCYTE [DISTWIDTH] IN BLOOD BY AUTOMATED COUNT: 21.1 % (ref 11.6–14.5)
GLUCOSE SERPL-MCNC: 112 MG/DL (ref 74–99)
GLUCOSE SERPL-MCNC: 165 MG/DL (ref 74–99)
HCT VFR BLD AUTO: 20.8 % (ref 35–45)
HCT VFR BLD AUTO: 21.8 % (ref 35–45)
HGB BLD-MCNC: 6.9 G/DL (ref 12–16)
HGB BLD-MCNC: 7.2 G/DL (ref 12–16)
LYMPHOCYTES # BLD: 3 K/UL (ref 0.8–3.5)
LYMPHOCYTES NFR BLD: 15 % (ref 20–51)
MAGNESIUM SERPL-MCNC: 1.2 MG/DL (ref 1.6–2.6)
MCH RBC QN AUTO: 23 PG (ref 24–34)
MCH RBC QN AUTO: 23.3 PG (ref 24–34)
MCHC RBC AUTO-ENTMCNC: 33 G/DL (ref 31–37)
MCHC RBC AUTO-ENTMCNC: 33.2 G/DL (ref 31–37)
MCV RBC AUTO: 69.3 FL (ref 74–97)
MCV RBC AUTO: 70.6 FL (ref 74–97)
MONOCYTES # BLD: 0.8 K/UL (ref 0–1)
MONOCYTES NFR BLD: 4 % (ref 2–9)
NEUTS BAND NFR BLD MANUAL: 8 % (ref 0–5)
NEUTS SEG # BLD: 14.4 K/UL (ref 1.8–8)
NEUTS SEG NFR BLD: 73 % (ref 42–75)
PLATELET # BLD AUTO: 108 K/UL (ref 135–420)
PLATELET # BLD AUTO: 145 K/UL (ref 135–420)
PLATELET COMMENTS,PCOM: ABNORMAL
POTASSIUM SERPL-SCNC: 4.3 MMOL/L (ref 3.5–5.5)
POTASSIUM SERPL-SCNC: 4.4 MMOL/L (ref 3.5–5.5)
RBC # BLD AUTO: 3 M/UL (ref 4.2–5.3)
RBC # BLD AUTO: 3.09 M/UL (ref 4.2–5.3)
RBC MORPH BLD: ABNORMAL
SODIUM SERPL-SCNC: 139 MMOL/L (ref 136–145)
SODIUM SERPL-SCNC: 139 MMOL/L (ref 136–145)
WBC # BLD AUTO: 18 K/UL (ref 4.6–13.2)
WBC # BLD AUTO: 19.7 K/UL (ref 4.6–13.2)

## 2019-07-21 PROCEDURE — 85027 COMPLETE CBC AUTOMATED: CPT

## 2019-07-21 PROCEDURE — 74011000258 HC RX REV CODE- 258: Performed by: MIDWIFE

## 2019-07-21 PROCEDURE — 36415 COLL VENOUS BLD VENIPUNCTURE: CPT

## 2019-07-21 PROCEDURE — 80048 BASIC METABOLIC PNL TOTAL CA: CPT

## 2019-07-21 PROCEDURE — 65270000029 HC RM PRIVATE

## 2019-07-21 PROCEDURE — 74011250636 HC RX REV CODE- 250/636: Performed by: ANESTHESIOLOGY

## 2019-07-21 PROCEDURE — 74011250636 HC RX REV CODE- 250/636: Performed by: FAMILY MEDICINE

## 2019-07-21 PROCEDURE — 36430 TRANSFUSION BLD/BLD COMPNT: CPT

## 2019-07-21 PROCEDURE — 83735 ASSAY OF MAGNESIUM: CPT

## 2019-07-21 PROCEDURE — P9016 RBC LEUKOCYTES REDUCED: HCPCS

## 2019-07-21 PROCEDURE — 74011250636 HC RX REV CODE- 250/636: Performed by: MIDWIFE

## 2019-07-21 PROCEDURE — 74011250637 HC RX REV CODE- 250/637: Performed by: MIDWIFE

## 2019-07-21 RX ORDER — SODIUM CHLORIDE 9 MG/ML
150 INJECTION, SOLUTION INTRAVENOUS CONTINUOUS
Status: DISCONTINUED | OUTPATIENT
Start: 2019-07-21 | End: 2019-07-21

## 2019-07-21 RX ORDER — MAGNESIUM SULFATE HEPTAHYDRATE 40 MG/ML
2 INJECTION, SOLUTION INTRAVENOUS ONCE
Status: COMPLETED | OUTPATIENT
Start: 2019-07-21 | End: 2019-07-21

## 2019-07-21 RX ORDER — SODIUM CHLORIDE 9 MG/ML
250 INJECTION, SOLUTION INTRAVENOUS AS NEEDED
Status: DISCONTINUED | OUTPATIENT
Start: 2019-07-21 | End: 2019-07-23 | Stop reason: HOSPADM

## 2019-07-21 RX ORDER — SODIUM CHLORIDE 9 MG/ML
75 INJECTION, SOLUTION INTRAVENOUS CONTINUOUS
Status: DISCONTINUED | OUTPATIENT
Start: 2019-07-21 | End: 2019-07-23 | Stop reason: HOSPADM

## 2019-07-21 RX ADMIN — SODIUM CHLORIDE 150 ML/HR: 900 INJECTION, SOLUTION INTRAVENOUS at 06:13

## 2019-07-21 RX ADMIN — KETOROLAC TROMETHAMINE 30 MG: 30 INJECTION, SOLUTION INTRAMUSCULAR at 14:19

## 2019-07-21 RX ADMIN — CLINDAMYCIN PHOSPHATE 900 MG: 900 INJECTION, SOLUTION INTRAVENOUS at 00:05

## 2019-07-21 RX ADMIN — KETOROLAC TROMETHAMINE 30 MG: 30 INJECTION, SOLUTION INTRAMUSCULAR at 08:08

## 2019-07-21 RX ADMIN — SODIUM CHLORIDE 150 ML/HR: 900 INJECTION, SOLUTION INTRAVENOUS at 12:53

## 2019-07-21 RX ADMIN — OXYCODONE HYDROCHLORIDE AND ACETAMINOPHEN 1 TABLET: 5; 325 TABLET ORAL at 16:42

## 2019-07-21 RX ADMIN — KETOROLAC TROMETHAMINE 30 MG: 30 INJECTION, SOLUTION INTRAMUSCULAR at 20:19

## 2019-07-21 RX ADMIN — Medication 10 ML: at 00:06

## 2019-07-21 RX ADMIN — SODIUM CHLORIDE 150 ML/HR: 900 INJECTION, SOLUTION INTRAVENOUS at 09:44

## 2019-07-21 RX ADMIN — CLINDAMYCIN PHOSPHATE 900 MG: 900 INJECTION, SOLUTION INTRAVENOUS at 08:09

## 2019-07-21 RX ADMIN — SIMETHICONE CHEW TAB 80 MG 80 MG: 80 TABLET ORAL at 16:52

## 2019-07-21 RX ADMIN — KETOROLAC TROMETHAMINE 30 MG: 30 INJECTION, SOLUTION INTRAMUSCULAR at 00:05

## 2019-07-21 RX ADMIN — KETOROLAC TROMETHAMINE 30 MG: 30 INJECTION, SOLUTION INTRAMUSCULAR at 06:07

## 2019-07-21 RX ADMIN — GENTAMICIN SULFATE 530 MG: 40 INJECTION, SOLUTION INTRAMUSCULAR; INTRAVENOUS at 14:41

## 2019-07-21 RX ADMIN — Medication 10 ML: at 06:00

## 2019-07-21 RX ADMIN — MAGNESIUM SULFATE HEPTAHYDRATE 2 G: 40 INJECTION, SOLUTION INTRAVENOUS at 06:07

## 2019-07-21 RX ADMIN — CLINDAMYCIN PHOSPHATE 900 MG: 900 INJECTION, SOLUTION INTRAVENOUS at 16:30

## 2019-07-21 RX ADMIN — OXYCODONE HYDROCHLORIDE AND ACETAMINOPHEN 2 TABLET: 5; 325 TABLET ORAL at 22:56

## 2019-07-21 NOTE — PROGRESS NOTES
Reason for Admission:   Chart reviewed as CM on call; patient is a 28 yr old female with past medical hx of obesity, had repeat Csection for arrest of labor under epidural anesthesia and postpartum hemorrhage with acute blood loss anemia. Admitted to ICU postop. RRAT Score:       Low; 4              Plan for utilizing home health:      none                    Current Advanced Directive/Advance Care Plan: full code                         Transition of Care Plan:        Met with patient after transfer from ICU to womens's unit; daughter and sister present along with two other friends during interview. Patient states she has all needed items for infant and plan for discharge; CM to follow as needed.

## 2019-07-21 NOTE — PROGRESS NOTES
Post-Operative  Day 1    Becky Chandra     Assessment:   Hospital Problems  Date Reviewed: 2019          Codes Class Noted POA    Postpartum hemorrhage ICD-10-CM: O72.1  ICD-9-CM: 666.10  2019 No        Maternal care for scar from previous  delivery ICD-10-CM: O34.219  ICD-9-CM: 654.20  2019 Unknown        38 weeks gestation of pregnancy ICD-10-CM: Z3A.38  ICD-9-CM: V22.2  2019 Unknown            Post-Op day 1, stable in ICU     Plan:   1. Routine post-operative care   2. Hemoglobin stable at 7.2 - s/p 3 U PRBC   3. Advance diet to regular   4. Desire to breastfeed - discussed pumping q 2 hours   5. Continue Gentamycin and clidamycin - T max 100.9  (1747 ). Consider stopping at 24 hours afebrile      6. Will order incentive spirometer   7. I appreciate the consult and care provided by the ICU and hospitalist teams. Information for the patient's :  Johnny Kelley [[id number]]   , Low Transverse   Patient doing well without significant complaint. No Nausea and vomiting, tolerating liquids, no flatus. Pumping. Current Facility-Administered Medications   Medication Dose Route Frequency    0.9% sodium chloride infusion  150 mL/hr IntraVENous CONTINUOUS    gentamicin (GARAMYCIN) 530 mg in 0.9% sodium chloride 100 mL IVPB  530 mg IntraVENous Q24H    clindamycin (CLEOCIN) 900mg NS 50mL IVPB (premix)  900 mg IntraVENous Q8H    ketorolac (TORADOL) injection 30 mg  30 mg IntraVENous Q6H    sodium chloride (NS) flush 5-40 mL  5-40 mL IntraVENous Q8H    PHENYLephrine (JULES-SYNEPHRINE) 30 mg in 0.9% sodium chloride 250 mL infusion   mcg/min IntraVENous TITRATE        Vitals:  Visit Vitals  /52   Pulse 98   Temp 98.3 °F (36.8 °C)   Resp 25   Ht 5' 4\" (1.626 m)   Wt 106.1 kg (234 lb)   LMP 10/18/2018   SpO2 100%   Breastfeeding?  Unknown   BMI 40.17 kg/m²     Temp (24hrs), Av.1 °F (37.3 °C), Min:98 °F (36.7 °C), Max:100.9 °F (38.3 °C)      Last 24hr Input/Output:    Intake/Output Summary (Last 24 hours) at 7/21/2019 0838  Last data filed at 7/21/2019 0800  Gross per 24 hour   Intake 00112.58 ml   Output 5100 ml   Net 5324.58 ml          Exam:        Patient without distress. Lungs clear. Abdomen, bowel sounds decreased, soft, expected tenderness, fundus firm   Wound dressing removed - incision intact, no bleeding       Perineum normal lochia noted                  Lower extremities are negative for swelling, cords or tenderness.     Labs:   Lab Results   Component Value Date/Time    WBC 18.0 (H) 07/21/2019 04:37 AM    WBC 19.7 (H) 07/20/2019 11:50 PM    WBC 21.3 (H) 07/20/2019 05:59 PM    HGB 7.2 (L) 07/21/2019 04:37 AM    HGB 6.9 (L) 07/20/2019 11:50 PM    HGB 7.4 (L) 07/20/2019 05:59 PM    HCT 21.8 (L) 07/21/2019 04:37 AM    HCT 20.8 (L) 07/20/2019 11:50 PM    HCT 22.7 (L) 07/20/2019 05:59 PM    PLATELET 611 (L) 99/58/6638 04:37 AM    PLATELET 341 10/89/3186 11:50 PM    PLATELET 408 49/45/0871 05:59 PM       Recent Results (from the past 24 hour(s))   CBC W/O DIFF    Collection Time: 07/20/19  5:59 PM   Result Value Ref Range    WBC 21.3 (H) 4.6 - 13.2 K/uL    RBC 3.26 (L) 4.20 - 5.30 M/uL    HGB 7.4 (L) 12.0 - 16.0 g/dL    HCT 22.7 (L) 35.0 - 45.0 %    MCV 69.6 (L) 74.0 - 97.0 FL    MCH 22.7 (L) 24.0 - 34.0 PG    MCHC 32.6 31.0 - 37.0 g/dL    RDW 21.0 (H) 11.6 - 14.5 %    PLATELET 827 941 - 986 K/uL   FIBRINOGEN    Collection Time: 07/20/19  5:59 PM   Result Value Ref Range    Fibrinogen 274 210 - 451 mg/dL   PROTHROMBIN TIME + INR    Collection Time: 07/20/19  5:59 PM   Result Value Ref Range    Prothrombin time 15.9 (H) 11.5 - 15.2 sec    INR 1.3 (H) 0.8 - 1.2     METABOLIC PANEL, COMPREHENSIVE    Collection Time: 07/20/19  5:59 PM   Result Value Ref Range    Sodium 139 136 - 145 mmol/L    Potassium 5.1 3.5 - 5.5 mmol/L    Chloride 110 100 - 111 mmol/L    CO2 21 21 - 32 mmol/L    Anion gap 8 3.0 - 18 mmol/L    Glucose 146 (H) 74 - 99 mg/dL    BUN 4 (L) 7.0 - 18 MG/DL    Creatinine 0.87 0.6 - 1.3 MG/DL    BUN/Creatinine ratio 5 (L) 12 - 20      GFR est AA >60 >60 ml/min/1.73m2    GFR est non-AA >60 >60 ml/min/1.73m2    Calcium 7.3 (L) 8.5 - 10.1 MG/DL    Bilirubin, total 1.0 0.2 - 1.0 MG/DL    ALT (SGPT) 6 (L) 13 - 56 U/L    AST (SGOT) 9 (L) 10 - 38 U/L    Alk. phosphatase 78 45 - 117 U/L    Protein, total 2.9 (L) 6.4 - 8.2 g/dL    Albumin 1.3 (L) 3.4 - 5.0 g/dL    Globulin 1.6 (L) 2.0 - 4.0 g/dL    A-G Ratio 0.8 0.8 - 1.7     CBC WITH AUTOMATED DIFF    Collection Time: 07/20/19 11:50 PM   Result Value Ref Range    WBC 19.7 (H) 4.6 - 13.2 K/uL    RBC 3.00 (L) 4.20 - 5.30 M/uL    HGB 6.9 (L) 12.0 - 16.0 g/dL    HCT 20.8 (L) 35.0 - 45.0 %    MCV 69.3 (L) 74.0 - 97.0 FL    MCH 23.0 (L) 24.0 - 34.0 PG    MCHC 33.2 31.0 - 37.0 g/dL    RDW 20.6 (H) 11.6 - 14.5 %    PLATELET 436 541 - 220 K/uL    NEUTROPHILS 73 42 - 75 %    BAND NEUTROPHILS 8 (H) 0 - 5 %    LYMPHOCYTES 15 (L) 20 - 51 %    MONOCYTES 4 2 - 9 %    EOSINOPHILS 0 0 - 5 %    BASOPHILS 0 0 - 3 %    ABS. NEUTROPHILS 14.4 (H) 1.8 - 8.0 K/UL    ABS. LYMPHOCYTES 3.0 0.8 - 3.5 K/UL    ABS. MONOCYTES 0.8 0 - 1.0 K/UL    ABS. EOSINOPHILS 0.0 0.0 - 0.4 K/UL    ABS.  BASOPHILS 0.0 0.0 - 0.1 K/UL    PLATELET COMMENTS 1+ LARGE PLATELETS     RBC COMMENTS ANISOCYTOSIS  2+        RBC COMMENTS MICROCYTOSIS  1+        RBC COMMENTS HYPOCHROMIA  SLIGHT  POIKILOCYTOSIS  1+        RBC COMMENTS OVALOCYTES  1+        DF MANUAL     METABOLIC PANEL, BASIC    Collection Time: 07/21/19 12:45 AM   Result Value Ref Range    Sodium 139 136 - 145 mmol/L    Potassium 4.3 3.5 - 5.5 mmol/L    Chloride 111 100 - 111 mmol/L    CO2 21 21 - 32 mmol/L    Anion gap 7 3.0 - 18 mmol/L    Glucose 165 (H) 74 - 99 mg/dL    BUN 6 (L) 7.0 - 18 MG/DL    Creatinine 0.84 0.6 - 1.3 MG/DL    BUN/Creatinine ratio 7 (L) 12 - 20      GFR est AA >60 >60 ml/min/1.73m2    GFR est non-AA >60 >60 ml/min/1.73m2    Calcium 7.3 (L) 8.5 - 10.1 MG/DL CBC W/O DIFF    Collection Time: 07/21/19  4:37 AM   Result Value Ref Range    WBC 18.0 (H) 4.6 - 13.2 K/uL    RBC 3.09 (L) 4.20 - 5.30 M/uL    HGB 7.2 (L) 12.0 - 16.0 g/dL    HCT 21.8 (L) 35.0 - 45.0 %    MCV 70.6 (L) 74.0 - 97.0 FL    MCH 23.3 (L) 24.0 - 34.0 PG    MCHC 33.0 31.0 - 37.0 g/dL    RDW 21.1 (H) 11.6 - 14.5 %    PLATELET 355 (L) 974 - 297 K/uL   METABOLIC PANEL, BASIC    Collection Time: 07/21/19  4:37 AM   Result Value Ref Range    Sodium 139 136 - 145 mmol/L    Potassium 4.4 3.5 - 5.5 mmol/L    Chloride 110 100 - 111 mmol/L    CO2 23 21 - 32 mmol/L    Anion gap 6 3.0 - 18 mmol/L    Glucose 112 (H) 74 - 99 mg/dL    BUN 6 (L) 7.0 - 18 MG/DL    Creatinine 0.72 0.6 - 1.3 MG/DL    BUN/Creatinine ratio 8 (L) 12 - 20      GFR est AA >60 >60 ml/min/1.73m2    GFR est non-AA >60 >60 ml/min/1.73m2    Calcium 7.7 (L) 8.5 - 10.1 MG/DL   MAGNESIUM    Collection Time: 07/21/19  4:37 AM   Result Value Ref Range    Magnesium 1.2 (L) 1.6 - 2.6 mg/dL

## 2019-07-21 NOTE — PROGRESS NOTES
0800 Fundus firm @ U with scant amount of bleeding. Abdominal dressing intact, drainage noted on bottom of dressing from perineum. No other needs from this RN at this time, unit number given to ICU. 1130 TRANSFER - IN REPORT:    Verbal report received from IBRAHIMA Clarke RN (name) on Jacek Huber  being received from ICU (unit) for routine progression of care      Report consisted of patients Situation, Background, Assessment and   Recommendations(SBAR). Information from the following report(s) SBAR, Kardex, Intake/Output, MAR and Recent Results was reviewed with the receiving nurse. Opportunity for questions and clarification was provided. Assessment completed upon patients arrival to unit and care assumed. 1209 Vitals taken, see flow sheet. Cleaned patient up, partial bath complete with warm wipes. Admission assessment complete    1253 NS hung at 150 mL/hr. SCD's on    1325 NS rate change to 75 mL/hr    1410 Pharmacy notified for Gentamicin     1430 Followed up with pharmacy. Gentamicin being prepared    1930 Bedside shift change report given to KEVEN Gastelum RN (oncoming nurse) by Yahaira Nair RN (offgoing nurse). Report included the following information SBAR, Kardex, Intake/Output, MAR and Recent Results.

## 2019-07-21 NOTE — CONSULTS
[facility]  Pulmonary, Critical Care, and Sleep Medicine    Name: Allie Suarez MRN: [id number]   : 1985 Hospital: [facility] [place]    Date: 2019  Room: [address] [place] [address] Note                                              Consult requesting physician: Dr. Shannon Morley  Reason for Consult: hypotension    Subjective/History of Present Illness:     Patient is a 28 y.o. female with PMHx significant for obesity, underwent  section for arrest of labor under epidural anesthesia,  postpartum hemorrhage, ?ureter damage during surgery, acute blood loss anemia (4L blood loss intra-op), admitted to ICU after surgery for hypotension on vasopressor, off vasopressor now.     2019: After , admitted to icu for hypotension (while still on epidural) on neosynephrine. Since arrival to ICU, she has not needed to be started on chris. During surgery, lost about 4 L blood, received 2 PRBC transfusion before arrival to ICU. Received 1 more PRBC overnight. Also, received about 4 L crystalloids. Overnight, she required 500 ml bolus and increased IVF drip for low uop. Making goo uop now. Some blood in roldan, ?ureter damage during surgery. WBC improving. BP stable now. Platelets low 098  Creat normal  Temp max 100.9  On gentamycin and clinda per Ob. Mg replaced. UOP last 24 hrs: 1500 ml  I/O last 24 hrs: 5124 ml    History taken from patient, EMR     Review of Systems:  A comprehensive review of systems was negative except for that written in the HPI.      Review of Systems:   HEENT: No epistaxis, no nasal drainage, no difficulty in swallowing, no redness in eyes  Respiratory: as above  Cardiovascular: no chest pain, no palpitations, no chronic leg edema, no syncope  Gastrointestinal: no vomiting, no diarrhea  Genitourinary: No urinary symptoms or hematuria  Musculoskeletal:Neg  Neurological: No focal weakness, no seizures, no headaches  Behvioral/Psych: No anxiety, no depression  Constitutional: No fever, no chills, no weight loss, no night sweats     Allergies   Allergen Reactions    Penicillins Rash      History reviewed. No pertinent past medical history. Past Surgical History:   Procedure Laterality Date    HX GYN            Social History     Tobacco Use    Smoking status: Never Smoker    Smokeless tobacco: Never Used   Substance Use Topics    Alcohol use: No      History reviewed. No pertinent family history. Prior to Admission medications    Medication Sig Start Date End Date Taking? Authorizing Provider   prenatal vit-calcium-iron-fa (PRENATAL PLUS, CALCIUM CARB,) 27 mg iron- 1 mg tab Take 1 Tab by mouth daily. Yes Provider, Historical   ferrous sulfate (IRON) 325 mg (65 mg iron) tablet Take  by mouth Daily (before breakfast). Yes Provider, Historical     Current Facility-Administered Medications   Medication Dose Route Frequency    0.9% sodium chloride infusion  150 mL/hr IntraVENous CONTINUOUS    gentamicin (GARAMYCIN) 530 mg in 0.9% sodium chloride 100 mL IVPB  530 mg IntraVENous Q24H    clindamycin (CLEOCIN) 900mg NS 50mL IVPB (premix)  900 mg IntraVENous Q8H    ketorolac (TORADOL) injection 30 mg  30 mg IntraVENous Q6H    sodium chloride (NS) flush 5-40 mL  5-40 mL IntraVENous Q8H    PHENYLephrine (JULES-SYNEPHRINE) 30 mg in 0.9% sodium chloride 250 mL infusion   mcg/min IntraVENous TITRATE         Objective:   Vital Signs:    Visit Vitals  BP (!) 108/38   Pulse (!) 102   Temp 98.3 °F (36.8 °C)   Resp 29   Ht 5' 4\" (1.626 m)   Wt 106.1 kg (234 lb)   LMP 10/18/2018   SpO2 100%   Breastfeeding?  Unknown   BMI 40.17 kg/m²       O2 Device: Room air       Temp (24hrs), Av.2 °F (37.3 °C), Min:98 °F (36.7 °C), Max:100.9 °F (38.3 °C)       Intake/Output:   Last shift:      701 - 1900  In: 300 [I.V.:300]  Out: -     Last 3 shifts: 1901 -  07  In: 07018.6 [I.V.:9764.6]  Out: 3305 [Urine:1500]      Intake/Output Summary (Last 24 hours) at 7/21/2019 1129  Last data filed at 7/21/2019 0800  Gross per 24 hour   Intake 13055.58 ml   Output 4850 ml   Net 5274.58 ml       Last 3 Recorded Weights in this Encounter    07/20/19 0523   Weight: 106.1 kg (234 lb)         Physical Exam:     General/Neurology: Alert, Awake, NAD. obese  Head:   Normocephalic, without obvious abnormality, atraumatic. Eye:   EOM intact, no scleral icterus, no pallor, no cyanosis. Nose:   No sinus tenderness  Throat:  Lips, mucosa, and tongue normal. No oral thrush. Neck:   Supple, symmetric. No lymphadenopathy. Trachea midline  Lung: Moderate air entry bilateral equal. No rales. No rhonchi. No wheezing. No stridors. No prolongded expiration. No accessory muscle use. Heart:   Regular rate & rhythm. S1 S2 present. No murmur. No JVD. Abdomen:  Soft. Mild surgical site discomfort. ND. +BS. Surgical site under dressing. Extremities:  No pedal edema. No cyanosis. No clubbing. Pulses: 2+ and symmetric in DP. Capillary refill: normal  Lymphatic:  No cervical or supraclavicular palpable lymphadenopathy. Musculoskeletal: No joint swelling. No tenderness. Skin:   Color, texture, turgor normal. No rashes or lesions.        Data:       Recent Results (from the past 24 hour(s))   CBC W/O DIFF    Collection Time: 07/20/19  5:59 PM   Result Value Ref Range    WBC 21.3 (H) 4.6 - 13.2 K/uL    RBC 3.26 (L) 4.20 - 5.30 M/uL    HGB 7.4 (L) 12.0 - 16.0 g/dL    HCT 22.7 (L) 35.0 - 45.0 %    MCV 69.6 (L) 74.0 - 97.0 FL    MCH 22.7 (L) 24.0 - 34.0 PG    MCHC 32.6 31.0 - 37.0 g/dL    RDW 21.0 (H) 11.6 - 14.5 %    PLATELET 364 341 - 647 K/uL   FIBRINOGEN    Collection Time: 07/20/19  5:59 PM   Result Value Ref Range    Fibrinogen 274 210 - 451 mg/dL   PROTHROMBIN TIME + INR    Collection Time: 07/20/19  5:59 PM   Result Value Ref Range    Prothrombin time 15.9 (H) 11.5 - 15.2 sec    INR 1.3 (H) 0.8 - 1.2     METABOLIC PANEL, COMPREHENSIVE    Collection Time: 07/20/19  5:59 PM   Result Value Ref Range    Sodium 139 136 - 145 mmol/L    Potassium 5.1 3.5 - 5.5 mmol/L    Chloride 110 100 - 111 mmol/L    CO2 21 21 - 32 mmol/L    Anion gap 8 3.0 - 18 mmol/L    Glucose 146 (H) 74 - 99 mg/dL    BUN 4 (L) 7.0 - 18 MG/DL    Creatinine 0.87 0.6 - 1.3 MG/DL    BUN/Creatinine ratio 5 (L) 12 - 20      GFR est AA >60 >60 ml/min/1.73m2    GFR est non-AA >60 >60 ml/min/1.73m2    Calcium 7.3 (L) 8.5 - 10.1 MG/DL    Bilirubin, total 1.0 0.2 - 1.0 MG/DL    ALT (SGPT) 6 (L) 13 - 56 U/L    AST (SGOT) 9 (L) 10 - 38 U/L    Alk. phosphatase 78 45 - 117 U/L    Protein, total 2.9 (L) 6.4 - 8.2 g/dL    Albumin 1.3 (L) 3.4 - 5.0 g/dL    Globulin 1.6 (L) 2.0 - 4.0 g/dL    A-G Ratio 0.8 0.8 - 1.7     CBC WITH AUTOMATED DIFF    Collection Time: 07/20/19 11:50 PM   Result Value Ref Range    WBC 19.7 (H) 4.6 - 13.2 K/uL    RBC 3.00 (L) 4.20 - 5.30 M/uL    HGB 6.9 (L) 12.0 - 16.0 g/dL    HCT 20.8 (L) 35.0 - 45.0 %    MCV 69.3 (L) 74.0 - 97.0 FL    MCH 23.0 (L) 24.0 - 34.0 PG    MCHC 33.2 31.0 - 37.0 g/dL    RDW 20.6 (H) 11.6 - 14.5 %    PLATELET 969 105 - 373 K/uL    NEUTROPHILS 73 42 - 75 %    BAND NEUTROPHILS 8 (H) 0 - 5 %    LYMPHOCYTES 15 (L) 20 - 51 %    MONOCYTES 4 2 - 9 %    EOSINOPHILS 0 0 - 5 %    BASOPHILS 0 0 - 3 %    ABS. NEUTROPHILS 14.4 (H) 1.8 - 8.0 K/UL    ABS. LYMPHOCYTES 3.0 0.8 - 3.5 K/UL    ABS. MONOCYTES 0.8 0 - 1.0 K/UL    ABS. EOSINOPHILS 0.0 0.0 - 0.4 K/UL    ABS.  BASOPHILS 0.0 0.0 - 0.1 K/UL    PLATELET COMMENTS 1+ LARGE PLATELETS     RBC COMMENTS ANISOCYTOSIS  2+        RBC COMMENTS MICROCYTOSIS  1+        RBC COMMENTS HYPOCHROMIA  SLIGHT  POIKILOCYTOSIS  1+        RBC COMMENTS OVALOCYTES  1+        DF MANUAL     METABOLIC PANEL, BASIC    Collection Time: 07/21/19 12:45 AM   Result Value Ref Range    Sodium 139 136 - 145 mmol/L    Potassium 4.3 3.5 - 5.5 mmol/L    Chloride 111 100 - 111 mmol/L    CO2 21 21 - 32 mmol/L    Anion gap 7 3.0 - 18 mmol/L    Glucose 165 (H) 74 - 99 mg/dL    BUN 6 (L) 7.0 - 18 MG/DL    Creatinine 0.84 0.6 - 1.3 MG/DL    BUN/Creatinine ratio 7 (L) 12 - 20      GFR est AA >60 >60 ml/min/1.73m2    GFR est non-AA >60 >60 ml/min/1.73m2    Calcium 7.3 (L) 8.5 - 10.1 MG/DL   CBC W/O DIFF    Collection Time: 07/21/19  4:37 AM   Result Value Ref Range    WBC 18.0 (H) 4.6 - 13.2 K/uL    RBC 3.09 (L) 4.20 - 5.30 M/uL    HGB 7.2 (L) 12.0 - 16.0 g/dL    HCT 21.8 (L) 35.0 - 45.0 %    MCV 70.6 (L) 74.0 - 97.0 FL    MCH 23.3 (L) 24.0 - 34.0 PG    MCHC 33.0 31.0 - 37.0 g/dL    RDW 21.1 (H) 11.6 - 14.5 %    PLATELET 053 (L) 041 - 222 K/uL   METABOLIC PANEL, BASIC    Collection Time: 07/21/19  4:37 AM   Result Value Ref Range    Sodium 139 136 - 145 mmol/L    Potassium 4.4 3.5 - 5.5 mmol/L    Chloride 110 100 - 111 mmol/L    CO2 23 21 - 32 mmol/L    Anion gap 6 3.0 - 18 mmol/L    Glucose 112 (H) 74 - 99 mg/dL    BUN 6 (L) 7.0 - 18 MG/DL    Creatinine 0.72 0.6 - 1.3 MG/DL    BUN/Creatinine ratio 8 (L) 12 - 20      GFR est AA >60 >60 ml/min/1.73m2    GFR est non-AA >60 >60 ml/min/1.73m2    Calcium 7.7 (L) 8.5 - 10.1 MG/DL   MAGNESIUM    Collection Time: 07/21/19  4:37 AM   Result Value Ref Range    Magnesium 1.2 (L) 1.6 - 2.6 mg/dL         Chemistry Recent Labs     07/21/19  0437 07/21/19  0045 07/20/19  1759   * 165* 146*    139 139   K 4.4 4.3 5.1    111 110   CO2 23 21 21   BUN 6* 6* 4*   CREA 0.72 0.84 0.87   CA 7.7* 7.3* 7.3*   MG 1.2*  --   --    AGAP 6 7 8   BUCR 8* 7* 5*   AP  --   --  78   TP  --   --  2.9*   ALB  --   --  1.3*   GLOB  --   --  1.6*   AGRAT  --   --  0.8        Lactic Acid No results found for: LAC  No results for input(s): LAC in the last 72 hours.      Liver Enzymes Protein, total   Date Value Ref Range Status   07/20/2019 2.9 (L) 6.4 - 8.2 g/dL Final     Albumin   Date Value Ref Range Status   07/20/2019 1.3 (L) 3.4 - 5.0 g/dL Final     Globulin   Date Value Ref Range Status   07/20/2019 1.6 (L) 2.0 - 4.0 g/dL Final     A-G Ratio   Date Value Ref Range Status   2019 0.8 0.8 - 1.7   Final     AST (SGOT)   Date Value Ref Range Status   2019 9 (L) 10 - 38 U/L Final     Comment:     PLEASE NOTE NEW REFERENCE RANGE     Alk. phosphatase   Date Value Ref Range Status   2019 78 45 - 117 U/L Final     Recent Labs     19  1759   TP 2.9*   ALB 1.3*   GLOB 1.6*   AGRAT 0.8   SGOT 9*   AP 78        CBC w/Diff Recent Labs     19  0437 19  2350 19  1759 19  1910   WBC 18.0* 19.7* 21.3* 10.7   RBC 3.09* 3.00* 3.26* 5.73*   HGB 7.2* 6.9* 7.4* 12.0   HCT 21.8* 20.8* 22.7* 36.7   * 145 152 218   GRANS  --  73  --  65   LYMPH  --  15*  --  27   EOS  --  0  --  2        Cardiac Enzymes No results found for: CPK, CK, CKMMB, CKMB, RCK3, CKMBT, CKNDX, CKND1, KESHAWN, TROPT, TROIQ, NIKKI, TROPT, TNIPOC, BNP, BNPP     BNP No results found for: BNP, BNPP, XBNPT     Coagulation Recent Labs     19  1759   PTP 15.9*   INR 1.3*         Thyroid  No results found for: T4, T3U, TSH, TSHEXT    No results found for: T4     Urinalysis Lab Results   Component Value Date/Time    Color YELLOW 2018 08:50 AM    Appearance CLEAR 2018 08:50 AM    Specific gravity 1.028 2018 08:50 AM    pH (UA) 5.5 2018 08:50 AM    Protein NEGATIVE  2018 08:50 AM    Glucose NEGATIVE  2018 08:50 AM    Ketone TRACE (A) 2018 08:50 AM    Bilirubin NEGATIVE  2018 08:50 AM    Urobilinogen 1.0 2018 08:50 AM    Nitrites NEGATIVE  2018 08:50 AM    Leukocyte Esterase NEGATIVE  2018 08:50 AM        Micro  No results for input(s): SDES, CULT in the last 72 hours. No results for input(s): CULT in the last 72 hours. ABG No results for input(s): PHI, PHI, POC2, PCO2I, PO2, PO2I, HCO3, HCO3I, FIO2, FIO2I in the last 72 hours.          IMPRESSION:   ·   Patient Active Problem List   Diagnosis Code    Maternal care for scar from previous  delivery O34.219    38 weeks gestation of pregnancy Z3A.38    Postpartum hemorrhage O72.1 ·   hypotension due to blood loss and epidural, hypotension resolved  · leucocytosis   · Thrombocytopenia   · ?ureter injury     · Code status: full       RECOMMENDATIONS:   Respiratory:   No acute issues. Protecting airway. On RA spo2 in high 90's  Keep SPO2 >=92%. HOB 30 degree elevation all the time. Aggressive pulmonary toileting. Aspiration precautions. Incentive spirometry. CVS: did not require neosynephrine since came to icu. BP stable in 100+. ID: gentamycin and clinda per Ob. T max 100.9. Wbc improving. Deescalate antibiotic when appropriate. Hematology/Oncology: received total of 3 units PRBC. Follow H&H and transfuse for any acute bleeding or Hb <7. Monitor platelets, low 628 currently. Renal: no acute issues. Making good urine. GI/: ?ureter issues during surgery. Dr. Madan Morgan has consulted urologist.  Endocrine: Monitor BS  Neurology: no acute issues  Pain/Sedation: pain controlled  Skin/Wound: local surgical site dressing  Electrolytes: Replace electrolytes per ICU electrolyte replacement protocol. IVF: RL was changed to LR last night due to K 5+. NS was increased to 150/hr and also received IVF bolus last night. Now making good UOP and tolerating regular diet. Reduced NS to 75 ml.hr.   Nutrition: regular diet   Prophylaxis: DVT Prophylaxis: SCD. Start heparin when ok by Ob. GI Prophylaxis: low risk for stress ulcer  Restraints: none  Lines/Tubes: PIV  Solis: per ob (Medically necessary for strict input/output monitoring in critically ill patient, will remove it when not needed. Solis bundle followed). Other drains: Will defer respective systems problem management to primary and other respective consultant and follow patient in ICU with primary and other medical team.  Further recommendations will be based on the patient's response to recommended treatment and results of the investigation ordered.   Quality Care: PPI, DVT prophylaxis, HOB elevated, Infection control all reviewed and addressed. Care of plan d/w RN, RT, MDR.  D/w patient (answered all questions to satisfaction). D/w Dr. Milton Garcia and anesthesiologist last night. High complexity decision making was performed during the evaluation of this patient at high risk for decompensation with multiple organ involvement. Total critical care time spent rendering care exclusive of procedures: 33 minutes. Patient being transferred out of icu. Once transferred, PCCM will sing off. Call us with any questions.           Madelyn Vera MD  7/21/2019

## 2019-07-21 NOTE — PROGRESS NOTES
Hospitalist Progress Note    Patient: Layne Hathaway MRN: [id number]  CSN: [id number]    YOB: 1985  Age: 28 y.o. Sex: female    DOA: 2019 LOS:  LOS: 2 days            Assessment/Plan     Active Problems:    Maternal care for scar from previous  delivery (2019)      38 weeks gestation of pregnancy (2019)      Postpartum hemorrhage (2019)      Hypotension due to blood loss (2019)      Neuro-no issues. Pulm-no issues, monitor for volume overload given large amount of crystalloid resuscitation. CV-tachycardic due to acute blood loss, no need neosynephrine and maintaining BPs, cont to monitor  Renal-decreased UOP at 20 cc/hr, will check BMP at midnight, gentle boluses as needed  GI-no issues. Heme-anemia due to blood loss. S/P 2 U PRBC so far. Will monitor CBC colosely and transfuse for Hgb<7. ID-fever intraoperatively, started on clinda/gent, cont to monitor  Endo-no issues  GYN-cont to monitor lochia, baby to visit mother/breastfeed as able, breast pump at bedside if needed     Lines-L peripheral IV site infiltrated with moderate amount of saline palpable subcutaneously (arm elevated and with ice), medic to restart new IV  Prophy-protonix, SCDs, no anticoagulation due to bleeding    CC:  29 y/o female POD#1 for RLTCS complicated by severe postpartum hemorrhage requiring aggressive fluid resuscitation and 2 U PRBC as well as pressor support who is in the ICU for monitoring.              Subjective:     Pt was seen and examined with the nurse in the morning round. Feeling much better after the PRBC transfusion. No active bleeding. No CP/SOB. Review of systems  General: No fevers or chills. Cardiovascular: No chest pain or pressure. No palpitations. Pulmonary: No cough, SOB  Gastrointestinal: No nausea, vomiting.      Objective:      Visit Vitals  BP (!) 109/36   Pulse (!) 113   Temp 98.6 °F (37 °C)   Resp 26   Ht 5' 4\" (1.626 m)   Wt 106.1 kg (234 lb) SpO2 100%   Breastfeeding? Unknown   BMI 40.17 kg/m²       Physical Exam:    Gen: NAD, non-toxic. Heent:  MMM, NC, AT. Cor: s1s2 RRR. No MRG. PMI mid 5th intercostal space. Resp:  CTA b/l. No w/r/r. Nml effort and diaphragmatic excursion. Abd:  NT ND.  BS positive. No rebound or guarding. No masses. Ext: No edema or cyanosis. Intake and Output:  Current Shift:  07/21 0701 - 07/21 1900  In: 800 [P.O.:400; I.V.:400]  Out: 1300 [Urine:1300]  Last three shifts:  07/19 1901 - 07/21 0700  In: 85244.6 [I.V.:9764.6]  Out: 5500 [Urine:1500]    Labs: Results:       Chemistry Recent Labs     07/21/19 0437 07/21/19  0045 07/20/19 1759   * 165* 146*    139 139   K 4.4 4.3 5.1    111 110   CO2 23 21 21   BUN 6* 6* 4*   CREA 0.72 0.84 0.87   CA 7.7* 7.3* 7.3*   AGAP 6 7 8   BUCR 8* 7* 5*   AP  --   --  78   TP  --   --  2.9*   ALB  --   --  1.3*   GLOB  --   --  1.6*   AGRAT  --   --  0.8      CBC w/Diff Recent Labs     07/21/19 0437 07/20/19  2350 07/20/19 1759 07/19/19  1910   WBC 18.0* 19.7* 21.3* 10.7   RBC 3.09* 3.00* 3.26* 5.73*   HGB 7.2* 6.9* 7.4* 12.0   HCT 21.8* 20.8* 22.7* 36.7   * 145 152 218   GRANS  --  73  --  65   LYMPH  --  15*  --  27   EOS  --  0  --  2      Cardiac Enzymes No results for input(s): CPK, CKND1, KESHAWN in the last 72 hours. No lab exists for component: CKRMB, TROIP   Coagulation Recent Labs     07/20/19 1759   PTP 15.9*   INR 1.3*       Lipid Panel No results found for: CHOL, CHOLPOCT, CHOLX, CHLST, CHOLV, 217289, HDL, LDL, LDLC, DLDLP, 190595, VLDLC, VLDL, TGLX, TRIGL, TRIGP, TGLPOCT, CHHD, CHHDX   BNP No results for input(s): BNPP in the last 72 hours.    Liver Enzymes Recent Labs     07/20/19 1759   TP 2.9*   ALB 1.3*   AP 78   SGOT 9*      Thyroid Studies No results found for: T4, T3U, TSH, TSHEXT, TSHEXT     Procedures/imaging: see electronic medical records for all procedures/Xrays and details which were not copied into this note but were reviewed prior to creation of Plan      Medications Reviewed  Luigi Lovett MD

## 2019-07-21 NOTE — CONSULTS
Medicine Consult    Patient:  Gely Zhao 28 y.o. female  Asked to evaluate patient by Dr. Jose Parmar  Primary Care Provider:  Derek Mayer MD  Date of Admission:  2019  Reason for Consult:         Assessment/Plan     Patient Active Problem List   Diagnosis Code    Maternal care for scar from previous  delivery O31.200    38 weeks gestation of pregnancy Z3A.38    Postpartum hemorrhage O72.1       PLAN:    29 y/o female POD#1 for RLTCS complicated by severe postpartum hemorrhage requiring aggressive fluid resuscitation and 2 U PRBC as well as pressor support who is in the ICU for monitoring. Neuro-no issues. Pulm-no issues, monitor for volume overload given large amount of crystalloid resuscitation. CV-tachycardic due to acute blood loss, off neosynephrine and maintaining BPs, cont to monitor  Renal-decreased UOP at 20 cc/hr, will check BMP at midnight, gentle boluses as needed  GI-no issues. Heme-anemia due to blood loss. Transfused 2 U PRBC so far. Will repeat CBC at midnight and transfuse for Hgb<7. ID-fever intraoperatively, started on clinda/gent, cont to monitor  Endo-no issues  GYN-cont to monitor lochia, baby to visit mother/breastfeed as able, breast pump at bedside if needed    Lines-L peripheral IV site infiltrated with moderate amount of saline palpable subcutaneously (arm elevated and with ice), medic to restart new IV  Prophy-protonix, SCDs, no anticoagulation due to bleeding    Donnie Salmeron MD  Nocturnist    Thank you for allowing us to participate in this patient's care. HPI:   CC:  Gely Zhao is a 28 y.o. female with past medical history significant for prior C/S who attempted  and was taken to the OR for RLTCS which was complicated by postpartum hemorrhage. She received 8L fluid and 2 U PRBC and was started on neosynephrine intraoperatively. She was transferred to the ICU for monitoring. History reviewed. No pertinent past medical history.   Past Surgical History:   Procedure Laterality Date    HX GYN            Social History     Tobacco Use    Smoking status: Never Smoker    Smokeless tobacco: Never Used   Substance Use Topics    Alcohol use: No    Drug use: No     History reviewed. No pertinent family history. No current facility-administered medications on file prior to encounter. No current outpatient medications on file prior to encounter. Allergies   Allergen Reactions    Penicillins Rash       Review of Systems  Constitutional: No fever, chills, diaphoresis, malaise, fatigue or weight gain/loss or falls  CARDIOVASCULAR: no claudication, cp, palpitations, orthopnea, pnd or LE edema  PULMONARY: no cough, wheeze, shortness of breath or sputum production  GI: no nausea, vomiting, diarrhea, abdominal pain, melena, hematemesis or brbpr  : no dysuria, hematuria  MUSCULOSKELETAL: no back pain, joint pain or myalgias  ENDOCRINE: no heat/cold intolerance, polyuria or polydipsia  GYN: mild lochia  NEURO: no unilateral weakness, numbness, tingling or seizures      Physical Exam:      Visit Vitals  /43   Pulse (!) 117   Temp (!) 100.9 °F (38.3 °C)   Resp 20   Ht 5' 4\" (1.626 m)   Wt 106.1 kg (234 lb)   SpO2 95%   Breastfeeding? Unknown   BMI 40.17 kg/m²     Body mass index is 40.17 kg/m².     Physical Exam:  GEN: well nourished, laying in bed in no acute distress  HEENT: atraumatic, nose normal,oropharynx clear, MMM  NECK: supple, trachea midline  EYES: conjuctiva normal, lids with out lesions, PERRL  HEART: RRR with out m/r/g, pulses 2+ distally  LUNGS: equal chest wall expansion, cta bl with out wheezes/rales or rhonchi  AB: soft, +BS, nt/nd no organomegaly  NEURO: alert, awake and oriented x3, gait not assessed  SKIN: dry, intact, warm no breakdown noted        Laboratory Studies:    Recent Results (from the past 24 hour(s))   CBC W/O DIFF    Collection Time: 19  5:59 PM   Result Value Ref Range    WBC 21.3 (H) 4.6 - 13.2 K/uL    RBC 3.26 (L) 4.20 - 5.30 M/uL    HGB 7.4 (L) 12.0 - 16.0 g/dL    HCT 22.7 (L) 35.0 - 45.0 %    MCV 69.6 (L) 74.0 - 97.0 FL    MCH 22.7 (L) 24.0 - 34.0 PG    MCHC 32.6 31.0 - 37.0 g/dL    RDW 21.0 (H) 11.6 - 14.5 %    PLATELET 554 391 - 451 K/uL   FIBRINOGEN    Collection Time: 07/20/19  5:59 PM   Result Value Ref Range    Fibrinogen 274 210 - 451 mg/dL   PROTHROMBIN TIME + INR    Collection Time: 07/20/19  5:59 PM   Result Value Ref Range    Prothrombin time 15.9 (H) 11.5 - 15.2 sec    INR 1.3 (H) 0.8 - 1.2     METABOLIC PANEL, COMPREHENSIVE    Collection Time: 07/20/19  5:59 PM   Result Value Ref Range    Sodium 139 136 - 145 mmol/L    Potassium 5.1 3.5 - 5.5 mmol/L    Chloride 110 100 - 111 mmol/L    CO2 21 21 - 32 mmol/L    Anion gap 8 3.0 - 18 mmol/L    Glucose 146 (H) 74 - 99 mg/dL    BUN 4 (L) 7.0 - 18 MG/DL    Creatinine 0.87 0.6 - 1.3 MG/DL    BUN/Creatinine ratio 5 (L) 12 - 20      GFR est AA >60 >60 ml/min/1.73m2    GFR est non-AA >60 >60 ml/min/1.73m2    Calcium 7.3 (L) 8.5 - 10.1 MG/DL    Bilirubin, total 1.0 0.2 - 1.0 MG/DL    ALT (SGPT) 6 (L) 13 - 56 U/L    AST (SGOT) 9 (L) 10 - 38 U/L    Alk.  phosphatase 78 45 - 117 U/L    Protein, total 2.9 (L) 6.4 - 8.2 g/dL    Albumin 1.3 (L) 3.4 - 5.0 g/dL    Globulin 1.6 (L) 2.0 - 4.0 g/dL    A-G Ratio 0.8 0.8 - 1.7

## 2019-07-21 NOTE — PROGRESS NOTES
Problem:  Delivery: Postpartum Day 1  Goal: Activity/Safety  Outcome: Progressing Towards Goal  Goal: Consults, if ordered  Outcome: Progressing Towards Goal  Goal: Diagnostic Test/Procedures  Outcome: Progressing Towards Goal  Goal: Nutrition/Diet  Outcome: Progressing Towards Goal  Goal: Discharge Planning  Outcome: Progressing Towards Goal  Goal: Medications  Outcome: Progressing Towards Goal  Goal: Respiratory  Outcome: Progressing Towards Goal  Goal: Treatments/Interventions/Procedures  Outcome: Progressing Towards Goal  Goal: Psychosocial  Outcome: Progressing Towards Goal  Goal: *Vital signs within defined limits  Outcome: Progressing Towards Goal  Goal: *Labs within defined limits  Outcome: Progressing Towards Goal  Goal: *Hemodynamically stable  Outcome: Progressing Towards Goal  Goal: *Optimal pain control at patient's stated goal  Outcome: Progressing Towards Goal  Goal: *Participates in infant care  Outcome: Progressing Towards Goal  Goal: *Demonstrates progressive activity  Outcome: Progressing Towards Goal  Goal: *Tolerating diet  Outcome: Progressing Towards Goal  Goal: *Performs self perineal care  Outcome: Progressing Towards Goal

## 2019-07-21 NOTE — PROGRESS NOTES
-Bedside and Verbal shift change report given to Giovanni Parker RN (oncoming nurse) by Leanna Garcia RN (offgoing nurse). Report included the following information SBAR, Kardex, Procedure Summary, MAR, Cardiac Rhythm ST and Alarm Parameters . LR infusing at 125ml/hr, and Pitocin 20units/1L NS infusing at 125ml/hr.  - Pt assessed and vitals obtained. AxOx4, drowsy, exhausted, diaphoretic, temp 98.0. -135, /54 Map 69, +2 Nonpitting edema BUE, +1 nonpitting BLE, pulses palpable throughout. Lung sounds clear on RA, denies SOB, RR 23. Abdomen soft RUQ LUQ, tender, per L&D RN fundus is firm at level of umbilicus, bowel sounds active. Solis in place emptied for 100 bloody drainage, urometer placed on bag. Skin intact other than  site which has minor bleeding present on lower portion of dressing. Maricruz pad in place with minimal bleeding and drainage. PIV in place in BFA, both presently puffy, but with blood return, infusing LR at 125ml/hr, and Pitocin infusing at 125ml/hr. Will continue to monitor in conjunction with L&D RN Binta Jefferson, see EMR for full details.  - Discussed with SAE Bragg low urine output, deferred to ICU MD, paged MD Lou Beth for urine output of 20cc in past 90 min, MD orders hospitalist consult, NS bolus 500cc, change LR to NS and increase rate to 150cc/hr.  - MD Lorren Shone into see pt, IV bolus partially infiltrated. IV removed, Ice pack applied. 2330 - OUP 105cc in 2hrs.  - RRT medic in to start new IV.   0000 - Pt reassessed and vitals obtained. Minimal changes to assessment at this time. Continued fundal height at umbilicus and minimal vaginal drainage and no changes to  site. L&D RNs at bedside to remove epidural and provide education on breast pump. Will continue to jointly monitor, see EMR for full details. Namita - MD Lorren Shone paged to notify Hemoglobin 6.9, MD orders for another unit of blood.  MD DCed NS at 150, when blood began, to avoid fluid overload. 0222 - Third unit of PRBCs started. 0400 - Pt assessed and vitals obtained. Minimal changes to assessment at this time. Per L&D RN no major changes. Will continue to monitor jointly, see EMR for full details. 0422 - Third unit of PRBCs ended. 1 - Lab work resulted, Mag ordered. NS restarted at 150cc/hr. 1476 - Bedside and Verbal shift change report given to Suma Cano RN (oncoming nurse) by Akbar White RN (offgoing nurse).  Report included the following information SBAR, Kardex, Procedure Summary, Intake/Output, Med Rec Status and Cardiac Rhythm SR.

## 2019-07-21 NOTE — OP NOTES
[facility] [place]  OPERATIVE REPORT    Name:  Lesly Fields  MR#:   [id number]  :  1985  ACCOUNT #:  [de-identified]  DATE OF SERVICE: 2019    PREOPERATIVE DIAGNOSES:  1. Failure to progress. 2.  39 weeks' gestation. 3.  Morbid obesity. POSTOPERATIVE DIAGNOSES:  1. Failure to progress. 2.  39 weeks' gestation. 3.  Morbid obesity  4. Postpartum hemorrhage. 5.  Extensive pelvic adhesions. PROCEDURES PERFORMED:  1. Repeat low transverse  section. 2.  Extensive lysis of adhesions. SURGEON:  Jameson Colbert MD    ASSISTANT:  Sisi Garzon    ANESTHESIA:  Epidural.    COMPLICATIONS:  Postpartum hemorrhage due to an extension of the uterine incision to the left parametrium. IMPLANTS:  None. ESTIMATED BLOOD LOSS:  4 L. IV FLUIDS:  8 L.    URINE OUTPUT:  150 mL. BLOOD TRANSFUSED:  2 units packed red blood cells. PATHOLOGY:  Placenta. FINDINGS:  Live female infant. Apgars 6, 7, and 9. Weight 8 pounds 3 ounces. There were thin, filmy adhesions and thick cords of myometrium adherent to the anterior abdominal wall. There were also adhesions of the bladder to the anterior abdominal wall as well. The lower uterine segment was very thin. The low transverse uterine incision extended caudad and into the left parametrial vessels. PROCEDURE:  The patient was taken to the operating room after informed consent was obtained. She was then prepped and draped in a normal sterile fashion in dorsal supine position with a leftward tilt. Timeout was completed. Adequate anesthesia was confirmed with the Allis test.  At this time, a Pfannenstiel skin incision was made on the lower abdomen two fingerbreadths above the pubic bone with a scalpel. The incision was carried down to the fascia with a Bovie. The fascia was incised in the midline and the incision was extended laterally with a Parsons scissors.   The inferior margin of the fascial incision was then grasped with Kocher clamps, elevated, and underlying rectus muscles dissected off. Attention was then turned to the superior aspect of the fascial incision which in a similar fashion was grasped with Kocher clamps, elevated, and the underlying rectus muscles dissected off. The rectus muscles were noted to be scarred in the midline. Allis clamps were applied and with careful sharp dissection were  with a scalpel and Metzenbaum scissors. The peritoneum was entered with good visualization of the bladder. Upon inspection, there was noted to be dense adhesions of the anterior uterine wall and bladder up to the midportion of the uterus. Careful dissection of the thick myometrial cords and thin filmy adhesions was performed with Metzenbaum scissors as well as the Bovie. There were noted to be especially dense adhesions on the right lateral aspect of the uterus and in the midline. A bladder flap was then created with Metzenbaum scissors. An Horacio retractor was then placed. There was noted to be a concave scar on the lower uterine segment. Although the myometrium was thin, no uterine window was noted. A low transverse incision was then made on the uterus. Thick meconium was noted. With the assistance of the nurse, pushing up on the fetal head from below, the head was delivered. Due to the thick meconium, nose and mouth were suctioned on the field, but there was no delayed cord clamping. Cord was clamped and cut and the infant was passed off to the awaiting pediatricians. Cord blood was collected. The placenta was delivered manually and intact. There was noted to be a significant amount of bleeding from the left lateral aspect of the uterus down near the cervix. This appeared to be an inferior extension of the incision towards the leftt parametrium and blood vessels. Multiple sutures of 1 Monocryl were placed in interrupted and figure-of-eight fashions to control the bleeding.   Once the bleeding was not as brisk and somewhat controlled, attention was turned to the right lateral aspect which was closed with 1 Monocryl suture in a running, locked fashion. Attention was then turned back on the left side where sutures were applied as needed to obtain hemostasis. There was also noted to be some bleeding in the midline just posterior to the bladder. 3-0 Vicryl sutures were placed in a figure-of-eight fashion in this area as well. A moist lap was then applied to the area for pressure and the remainder of the uterine incision was then closed with 1 Monocryl suture in a running, locked fashion. I called for blood while we were working to control of the bleeding. The patient was also aggressively hydrated. Once hemostasis was obtained, Surgicel was applied to the lower aspect of the incision. The Bovie and sutures were also used to obtain hemostasis on the anterior portion of the uterus where the adhesions were lysed. Surgicel was applied. The retractor was removed. All counts were correct at this time. The peritoneum was then closed with 3-0 Vicryl suture in a running fashion. One interrupted suture was placed in the rectus muscle. The fascia was then closed with 0 Vicryl suture in a running fashion. A 2-0 plain suture was then applied into the subcutaneous tissue in an interrupted fashion. The skin was closed with 3-0 Monocryl suture in a running  Fashion. A pressure dressing was also applied. Again, all sponge, lap, and needle counts were correct. The patient was transferred to the ICU for close observation. I discussed the operative events with the patient as well as with her family immediately postop. All questions were answered.       MD RADHA [email or web address]  D:  07/21/2019 9:31  T:  07/21/2019 18:01  JOB #:  [id number]

## 2019-07-21 NOTE — PROGRESS NOTES
0730: Verbal shift change report given to Leyla July (oncoming nurse) by Gage Lopez (offgoing nurse). Report included the following information SBAR, Kardex, Intake/Output, MAR, Recent Results and Cardiac Rhythm NSR.   0800: [facility] RN at bedside to check fundus and lochia, see note. Shift assessment completed. See flowsheets. Patient alert and oriented. VSS. Lung sounds clear. ABD tender. Incision site CDI, some drainage on dressing. Maricruz pad with scant blood. Pt c/o 9/10 ABD pain, Toradol given. Pulses palpable. +1 edema noted x 4 extremities. Will continue to monitor. 4088: Duplicate orders for toradol. Retimed scheduled dose based on the 0800 dose given PRN. OB MD paged. 1040: Unable to obtain labs at this time. 1115: Transfer orders in for patient to go to mother [facility]. 1120: Patient to go to L and D room 253  1130: MD Malini Kemp made aware of magnesium unable to be drawn. No new orders. MD Williams Lo made aware of Magnesium unable to be drawn, no new orders. 1200: Patient transferred to [facility].

## 2019-07-21 NOTE — PROGRESS NOTES
TRANSFER - OUT REPORT:    Verbal report given to Nallely Garcia (name) on [facility]  being transferred to [facility](unit) for routine progression of care       Report consisted of patients Situation, Background, Assessment and   Recommendations(SBAR). Information from the following report(s) SBAR, Kardex, OR Summary, Intake/Output, MAR, Recent Results and Cardiac Rhythm st was reviewed with the receiving nurse. Lines:   Peripheral IV 07/20/19 Left Hand (Active)   Site Assessment Clean, dry, & intact 7/21/2019  8:00 AM   Phlebitis Assessment 0 7/21/2019  8:00 AM   Infiltration Assessment 0 7/21/2019  8:00 AM   Dressing Status Clean, dry, & intact 7/21/2019  8:00 AM   Dressing Type Transparent 7/21/2019  8:00 AM   Hub Color/Line Status Infusing 7/21/2019  8:00 AM   Action Taken Open ports on tubing capped 7/21/2019  8:00 AM   Alcohol Cap Used Yes 7/21/2019  8:00 AM       Peripheral IV 07/20/19 Anterior;Right Wrist (Active)   Site Assessment Clean, dry, & intact 7/21/2019  8:00 AM   Phlebitis Assessment 0 7/21/2019  8:00 AM   Infiltration Assessment 0 7/21/2019  8:00 AM   Dressing Status Clean, dry, & intact 7/21/2019  8:00 AM   Dressing Type Transparent 7/21/2019  8:00 AM   Hub Color/Line Status Capped;Flushed 7/21/2019  8:00 AM   Action Taken Open ports on tubing capped 7/21/2019  8:00 AM   Alcohol Cap Used Yes 7/21/2019  8:00 AM        Opportunity for questions and clarification was provided.       Patient transported with:   Registered Nurse

## 2019-07-22 PROBLEM — E83.42 HYPOMAGNESEMIA: Status: ACTIVE | Noted: 2019-07-22

## 2019-07-22 PROBLEM — D62 ANEMIA DUE TO ACUTE BLOOD LOSS: Status: ACTIVE | Noted: 2019-07-21

## 2019-07-22 LAB
ANION GAP SERPL CALC-SCNC: 6 MMOL/L (ref 3–18)
BUN SERPL-MCNC: 9 MG/DL (ref 7–18)
BUN/CREAT SERPL: 15 (ref 12–20)
CALCIUM SERPL-MCNC: 7.8 MG/DL (ref 8.5–10.1)
CHLORIDE SERPL-SCNC: 109 MMOL/L (ref 100–111)
CO2 SERPL-SCNC: 26 MMOL/L (ref 21–32)
CREAT SERPL-MCNC: 0.6 MG/DL (ref 0.6–1.3)
ERYTHROCYTE [DISTWIDTH] IN BLOOD BY AUTOMATED COUNT: 20.9 % (ref 11.6–14.5)
GLUCOSE SERPL-MCNC: 94 MG/DL (ref 74–99)
HCT VFR BLD AUTO: 18 % (ref 35–45)
HCT VFR BLD AUTO: 18.6 % (ref 35–45)
HGB BLD-MCNC: 5.9 G/DL (ref 12–16)
HGB BLD-MCNC: 6.2 G/DL (ref 12–16)
MAGNESIUM SERPL-MCNC: 1.5 MG/DL (ref 1.6–2.6)
MCH RBC QN AUTO: 22.9 PG (ref 24–34)
MCHC RBC AUTO-ENTMCNC: 32.8 G/DL (ref 31–37)
MCV RBC AUTO: 69.8 FL (ref 74–97)
PLATELET # BLD AUTO: 139 K/UL (ref 135–420)
POTASSIUM SERPL-SCNC: 4 MMOL/L (ref 3.5–5.5)
RBC # BLD AUTO: 2.58 M/UL (ref 4.2–5.3)
SODIUM SERPL-SCNC: 141 MMOL/L (ref 136–145)
WBC # BLD AUTO: 17.2 K/UL (ref 4.6–13.2)

## 2019-07-22 PROCEDURE — 74011250637 HC RX REV CODE- 250/637: Performed by: OBSTETRICS & GYNECOLOGY

## 2019-07-22 PROCEDURE — 74011000258 HC RX REV CODE- 258: Performed by: MIDWIFE

## 2019-07-22 PROCEDURE — 83735 ASSAY OF MAGNESIUM: CPT

## 2019-07-22 PROCEDURE — 36415 COLL VENOUS BLD VENIPUNCTURE: CPT

## 2019-07-22 PROCEDURE — 85027 COMPLETE CBC AUTOMATED: CPT

## 2019-07-22 PROCEDURE — 80048 BASIC METABOLIC PNL TOTAL CA: CPT

## 2019-07-22 PROCEDURE — 36430 TRANSFUSION BLD/BLD COMPNT: CPT

## 2019-07-22 PROCEDURE — 85018 HEMOGLOBIN: CPT

## 2019-07-22 PROCEDURE — 74011250636 HC RX REV CODE- 250/636: Performed by: MIDWIFE

## 2019-07-22 PROCEDURE — P9016 RBC LEUKOCYTES REDUCED: HCPCS

## 2019-07-22 PROCEDURE — 65270000029 HC RM PRIVATE

## 2019-07-22 PROCEDURE — 74011250636 HC RX REV CODE- 250/636: Performed by: HOSPITALIST

## 2019-07-22 PROCEDURE — 74011250637 HC RX REV CODE- 250/637: Performed by: ADVANCED PRACTICE MIDWIFE

## 2019-07-22 RX ORDER — IBUPROFEN 400 MG/1
800 TABLET ORAL
Status: DISCONTINUED | OUTPATIENT
Start: 2019-07-22 | End: 2019-07-23 | Stop reason: HOSPADM

## 2019-07-22 RX ORDER — DOCUSATE SODIUM 100 MG/1
100 CAPSULE, LIQUID FILLED ORAL DAILY
Status: DISCONTINUED | OUTPATIENT
Start: 2019-07-22 | End: 2019-07-23 | Stop reason: HOSPADM

## 2019-07-22 RX ORDER — MAGNESIUM SULFATE HEPTAHYDRATE 40 MG/ML
2 INJECTION, SOLUTION INTRAVENOUS
Status: COMPLETED | OUTPATIENT
Start: 2019-07-22 | End: 2019-07-22

## 2019-07-22 RX ORDER — ACETAMINOPHEN 325 MG/1
650 TABLET ORAL ONCE
Status: COMPLETED | OUTPATIENT
Start: 2019-07-22 | End: 2019-07-22

## 2019-07-22 RX ORDER — SODIUM CHLORIDE 9 MG/ML
250 INJECTION, SOLUTION INTRAVENOUS AS NEEDED
Status: DISCONTINUED | OUTPATIENT
Start: 2019-07-22 | End: 2019-07-23 | Stop reason: HOSPADM

## 2019-07-22 RX ADMIN — DOCUSATE SODIUM 100 MG: 100 CAPSULE, LIQUID FILLED ORAL at 12:06

## 2019-07-22 RX ADMIN — ACETAMINOPHEN 650 MG: 325 TABLET ORAL at 08:05

## 2019-07-22 RX ADMIN — CLINDAMYCIN PHOSPHATE 900 MG: 900 INJECTION, SOLUTION INTRAVENOUS at 00:33

## 2019-07-22 RX ADMIN — IBUPROFEN 800 MG: 400 TABLET ORAL at 20:43

## 2019-07-22 RX ADMIN — CLINDAMYCIN PHOSPHATE 900 MG: 900 INJECTION, SOLUTION INTRAVENOUS at 08:05

## 2019-07-22 RX ADMIN — MAGNESIUM SULFATE HEPTAHYDRATE 2 G: 40 INJECTION, SOLUTION INTRAVENOUS at 12:42

## 2019-07-22 RX ADMIN — KETOROLAC TROMETHAMINE 30 MG: 30 INJECTION, SOLUTION INTRAMUSCULAR at 08:05

## 2019-07-22 RX ADMIN — KETOROLAC TROMETHAMINE 30 MG: 30 INJECTION, SOLUTION INTRAMUSCULAR at 02:36

## 2019-07-22 RX ADMIN — CLINDAMYCIN PHOSPHATE 900 MG: 900 INJECTION, SOLUTION INTRAVENOUS at 16:41

## 2019-07-22 RX ADMIN — KETOROLAC TROMETHAMINE 30 MG: 30 INJECTION, SOLUTION INTRAMUSCULAR at 15:01

## 2019-07-22 RX ADMIN — GENTAMICIN SULFATE 530 MG: 40 INJECTION, SOLUTION INTRAMUSCULAR; INTRAVENOUS at 15:01

## 2019-07-22 RX ADMIN — MAGNESIUM SULFATE HEPTAHYDRATE 2 G: 40 INJECTION, SOLUTION INTRAVENOUS at 12:09

## 2019-07-22 NOTE — PROGRESS NOTES
Post-Operative  Day 2    José Cunha     Assessment:   Hospital Problems  Date Reviewed: 2019          Codes Class Noted POA    Hypotension due to blood loss ICD-10-CM: I95.89  ICD-9-CM: 458.8  2019 Unknown        Anemia ICD-10-CM: D64.9  ICD-9-CM: 285.9  2019 Unknown        Postpartum hemorrhage ICD-10-CM: O72.1  ICD-9-CM: 666.10  2019 No        Maternal care for scar from previous  delivery ICD-10-CM: O34.219  ICD-9-CM: 654.20  2019 Unknown        38 weeks gestation of pregnancy ICD-10-CM: Z3A.38  ICD-9-CM: V22.2  2019 Unknown            Post-Op day 2, doing well no complaints    Plan:   1. Routine post-operative care  2. Anemia - hemoglobin now 5.9 from 7.2. Pt is asymptomatic. Will transfuse 1 unit. 3.  Encourage incentive spirometry and ambulation  4. Urology consulted regarding possible injury to ureter. Will order CT possibly tomorrow. Information for the patient's :  Jamee Arias [[id number]]   , Low Transverse   Patient doing well without significant complaint. Nausea and vomiting resolved, tolerating liquids, voiding and ambulating without difficulty. Current Facility-Administered Medications   Medication Dose Route Frequency    0.9% sodium chloride infusion  75 mL/hr IntraVENous CONTINUOUS    gentamicin (GARAMYCIN) 530 mg in 0.9% sodium chloride 100 mL IVPB  530 mg IntraVENous Q24H    clindamycin (CLEOCIN) 900mg NS 50mL IVPB (premix)  900 mg IntraVENous Q8H    ketorolac (TORADOL) injection 30 mg  30 mg IntraVENous Q6H    sodium chloride (NS) flush 5-40 mL  5-40 mL IntraVENous Q8H       Vitals:  Visit Vitals  /59 (BP 1 Location: Right arm, BP Patient Position: Sitting)   Pulse (!) 109   Temp 98.3 °F (36.8 °C)   Resp 20   Ht 5' 4\" (1.626 m)   Wt 106.1 kg (234 lb)   LMP 10/18/2018   SpO2 100%   Breastfeeding?  Unknown   BMI 40.17 kg/m²     Temp (24hrs), Av.7 °F (37.1 °C), Min:98 °F (36.7 °C), Max:99.6 °F (37.6 °C)        Exam:        Patient without distress. Abdomen, bowel sounds present, soft, expected tenderness, fundus firm      Wound incision clean, dry and intact                  Lower extremities are negative for swelling, cords or tenderness.     Labs:   Lab Results   Component Value Date/Time    WBC 17.2 (H) 07/22/2019 02:44 AM    WBC 18.0 (H) 07/21/2019 04:37 AM    WBC 19.7 (H) 07/20/2019 11:50 PM    HGB 5.9 (LL) 07/22/2019 02:44 AM    HGB 7.2 (L) 07/21/2019 04:37 AM    HGB 6.9 (L) 07/20/2019 11:50 PM    HCT 18.0 (L) 07/22/2019 02:44 AM    HCT 21.8 (L) 07/21/2019 04:37 AM    HCT 20.8 (L) 07/20/2019 11:50 PM    PLATELET 390 00/02/8107 02:44 AM    PLATELET 378 (L) 47/11/6725 04:37 AM    PLATELET 229 44/20/0260 11:50 PM       Recent Results (from the past 24 hour(s))   CBC W/O DIFF    Collection Time: 07/22/19  2:44 AM   Result Value Ref Range    WBC 17.2 (H) 4.6 - 13.2 K/uL    RBC 2.58 (L) 4.20 - 5.30 M/uL    HGB 5.9 (LL) 12.0 - 16.0 g/dL    HCT 18.0 (L) 35.0 - 45.0 %    MCV 69.8 (L) 74.0 - 97.0 FL    MCH 22.9 (L) 24.0 - 34.0 PG    MCHC 32.8 31.0 - 37.0 g/dL    RDW 20.9 (H) 11.6 - 14.5 %    PLATELET 796 641 - 691 K/uL

## 2019-07-22 NOTE — PROGRESS NOTES
Hospitalist Progress Note-critical care note     Patient: Zuleika Sanchezain MRN: [id number]  CSN: [id number]    YOB: 1985  Age: 28 y.o. Sex: female    DOA: 2019 LOS:  LOS: 3 days            Chief complaint: hypomagnesemia. Hypotension due to blood loss , anemia due to acute blood loss     Assessment/Plan         Hospital Problems  Date Reviewed: 2019          Codes Class Noted POA    Hypomagnesemia ICD-10-CM: E83.42  ICD-9-CM: 275.2  2019 Unknown        Hypotension due to blood loss ICD-10-CM: I95.89  ICD-9-CM: 458.8  2019 Unknown        Anemia due to acute blood loss ICD-10-CM: D62  ICD-9-CM: 285.1  2019         Postpartum hemorrhage ICD-10-CM: O72.1  ICD-9-CM: 666.10  2019 No        Maternal care for scar from previous  delivery ICD-10-CM: O34.219  ICD-9-CM: 654.20  2019 Unknown        38 weeks gestation of pregnancy ICD-10-CM: Z3A.38  ICD-9-CM: V22.2  2019 Unknown            Hypotension due to blood loss   Resolved per iv infusion and blood transfusion       Postpartum hemorrhage/anemia -due to acute blood loss   Still having a little bit vaginal bleeding,   continue transfusion, to keep at . will start iron replacement   Will recheck it. Hypomagnesemia   Mg replacement       Subjective: feel good     Family was at the bedside         Disposition :tbd,   Review of systems:    General: No fevers or chills. Cardiovascular: No chest pain or pressure. No palpitations. Pulmonary: No shortness of breath. Gastrointestinal: No nausea, vomiting. Gyn; mild vaginal bleeding     Vital signs/Intake and Output:  Visit Vitals  /52 (BP 1 Location: Right arm, BP Patient Position: Sitting)   Pulse (!) 117   Temp 99 °F (37.2 °C)   Resp 20   Ht 5' 4\" (1.626 m)   Wt 106.1 kg (234 lb)   SpO2 100%   Breastfeeding?  Unknown   BMI 40.17 kg/m²     Current Shift:  701 - 1900  In: 281   Out: -   Last three shifts:  1901 - 07  In: 1100 [P.O.:400; I.V.:400]  Out: 7619 [Urine:3725]    Physical Exam:  General: WD, WN. Alert, cooperative, no acute distress    HEENT: NC, Atraumatic. PERRLA, anicteric sclerae. Lungs: CTA Bilaterally. No Wheezing/Rhonchi/Rales. Heart:  Tachy,   No murmur, No Rubs, No Gallops  Abdomen: Soft, Non distended, Non tender.  +Bowel sounds,   Extremities: No c/c/e  Psych:   Not anxious or agitated. Neurologic:  No acute neurological deficit. Labs: Results:       Chemistry Recent Labs     07/21/19  0437 07/21/19  0045 07/20/19  1759   * 165* 146*    139 139   K 4.4 4.3 5.1    111 110   CO2 23 21 21   BUN 6* 6* 4*   CREA 0.72 0.84 0.87   CA 7.7* 7.3* 7.3*   AGAP 6 7 8   BUCR 8* 7* 5*   AP  --   --  78   TP  --   --  2.9*   ALB  --   --  1.3*   GLOB  --   --  1.6*   AGRAT  --   --  0.8      CBC w/Diff Recent Labs     07/22/19  0244 07/21/19  0437 07/20/19  2350  07/19/19  1910   WBC 17.2* 18.0* 19.7*   < > 10.7   RBC 2.58* 3.09* 3.00*   < > 5.73*   HGB 5.9* 7.2* 6.9*   < > 12.0   HCT 18.0* 21.8* 20.8*   < > 36.7    108* 145   < > 218   GRANS  --   --  73  --  65   LYMPH  --   --  15*  --  27   EOS  --   --  0  --  2    < > = values in this interval not displayed. Cardiac Enzymes No results for input(s): CPK, CKND1, KESHAWN in the last 72 hours. No lab exists for component: CKRMB, TROIP   Coagulation Recent Labs     07/20/19  1759   PTP 15.9*   INR 1.3*       Lipid Panel No results found for: CHOL, CHOLPOCT, CHOLX, CHLST, CHOLV, 217741, HDL, LDL, LDLC, DLDLP, 497471, VLDLC, VLDL, TGLX, TRIGL, TRIGP, TGLPOCT, CHHD, CHHDX   BNP No results for input(s): BNPP in the last 72 hours.    Liver Enzymes Recent Labs     07/20/19  1759   TP 2.9*   ALB 1.3*   AP 78   SGOT 9*      Thyroid Studies No results found for: T4, T3U, TSH, TSHEXT, TSHEXT     Procedures/imaging: see electronic medical records for all procedures/Xrays and details which were not copied into this note but were reviewed prior to creation of Peyman Gray MD

## 2019-07-22 NOTE — PROGRESS NOTES
Assessment complete. Vitals as charted with MEWS of 3. Patient already on double antibiotics. Pain 10. Med with toradol as ordered. [email or web address]. Assist to bathroom, and with pericare. Pt. Void 300. Then assist back to bed. Pt. Tolerated well and reports pain 7. Will monitor. 2100 Assist pt. To bathroom. Void 300 urine then back to bed. Pt. Tolerated well. Pain now 5/10.  2130 Patient to NICU via wheelchair per request to visit . 2200 Patient back to room and assist up to bathroom. Void 500, then back to bed. Patient states she is feeling much better. Will monitor. 2256 med with percocet 2 tabs per request for pain 5/10. Vitals as charted with MEWS of 3.

## 2019-07-22 NOTE — PROGRESS NOTES
Charts reviewed. BP is improved and stable. Pt is out of the ICU.     Cr is nl  Hct low at 18    Plan for assess the  tract tomorrow with CT urogram and cystogram to r/o ureteral and bladder injury

## 2019-07-22 NOTE — LACTATION NOTE
Patient on phone, will return. 1200 Per mom, infant latching and nursing well and pumping sometimes. Patient currently feeding infant a bottle and mom states she plans on doing both. Mom educated on breastfeeding basics--hunger cues, feeding on demand, waking baby if baby sleeps too long between feeds, importance of skin to skin, positioning and latching, risk of pacifier use and supplemental feedings, and importance of rooming in--and use of log sheet. Mom also educated on benefits of breastfeeding for herself and baby. Mom verbalized understanding. No questions at this time.

## 2019-07-22 NOTE — PROGRESS NOTES
Bedside and Verbal shift change report given to CHACORTA Hebert RN (oncoming nurse) by KEVEN Martel RN (offgoing nurse). Report given with Kristen STONER and MAR.

## 2019-07-23 ENCOUNTER — APPOINTMENT (OUTPATIENT)
Dept: CT IMAGING | Age: 34
DRG: 540 | End: 2019-07-23
Attending: UROLOGY
Payer: MEDICAID

## 2019-07-23 VITALS
HEIGHT: 64 IN | WEIGHT: 234 LBS | BODY MASS INDEX: 39.95 KG/M2 | DIASTOLIC BLOOD PRESSURE: 60 MMHG | TEMPERATURE: 97.6 F | RESPIRATION RATE: 20 BRPM | SYSTOLIC BLOOD PRESSURE: 126 MMHG | OXYGEN SATURATION: 100 % | HEART RATE: 86 BPM

## 2019-07-23 LAB
ABO + RH BLD: NORMAL
ANION GAP SERPL CALC-SCNC: 7 MMOL/L (ref 3–18)
BASOPHILS # BLD: 0 K/UL (ref 0–0.1)
BASOPHILS NFR BLD: 0 % (ref 0–3)
BLD PROD TYP BPU: NORMAL
BLOOD GROUP ANTIBODIES SERPL: NORMAL
BPU ID: NORMAL
BUN SERPL-MCNC: 7 MG/DL (ref 7–18)
BUN/CREAT SERPL: 10 (ref 12–20)
CALCIUM SERPL-MCNC: 7.8 MG/DL (ref 8.5–10.1)
CHLORIDE SERPL-SCNC: 111 MMOL/L (ref 100–111)
CO2 SERPL-SCNC: 25 MMOL/L (ref 21–32)
CREAT SERPL-MCNC: 0.67 MG/DL (ref 0.6–1.3)
CROSSMATCH RESULT,%XM: NORMAL
DIFFERENTIAL METHOD BLD: ABNORMAL
EOSINOPHIL # BLD: 0.5 K/UL (ref 0–0.4)
EOSINOPHIL NFR BLD: 3 % (ref 0–5)
ERYTHROCYTE [DISTWIDTH] IN BLOOD BY AUTOMATED COUNT: 21.2 % (ref 11.6–14.5)
GENTAMICIN SERPL-MCNC: 1.7 UG/ML (ref 0.5–10)
GLUCOSE SERPL-MCNC: 88 MG/DL (ref 74–99)
HCT VFR BLD AUTO: 20.3 % (ref 35–45)
HGB BLD-MCNC: 6.7 G/DL (ref 12–16)
LYMPHOCYTES # BLD: 2.7 K/UL (ref 0.8–3.5)
LYMPHOCYTES NFR BLD: 17 % (ref 20–51)
MAGNESIUM SERPL-MCNC: 1.6 MG/DL (ref 1.6–2.6)
MCH RBC QN AUTO: 22.9 PG (ref 24–34)
MCHC RBC AUTO-ENTMCNC: 33 G/DL (ref 31–37)
MCV RBC AUTO: 69.3 FL (ref 74–97)
MONOCYTES # BLD: 0.3 K/UL (ref 0–1)
MONOCYTES NFR BLD: 2 % (ref 2–9)
NEUTS BAND NFR BLD MANUAL: 2 % (ref 0–5)
NEUTS SEG # BLD: 12.2 K/UL (ref 1.8–8)
NEUTS SEG NFR BLD: 76 % (ref 42–75)
PLATELET # BLD AUTO: 166 K/UL (ref 135–420)
PLATELET COMMENTS,PCOM: ABNORMAL
PMV BLD AUTO: 10.6 FL (ref 9.2–11.8)
POTASSIUM SERPL-SCNC: 4.1 MMOL/L (ref 3.5–5.5)
RBC # BLD AUTO: 2.93 M/UL (ref 4.2–5.3)
RBC MORPH BLD: ABNORMAL
SODIUM SERPL-SCNC: 143 MMOL/L (ref 136–145)
SPECIMEN EXP DATE BLD: NORMAL
STATUS OF UNIT,%ST: NORMAL
UNIT DIVISION, %UDIV: 0
WBC # BLD AUTO: 16.1 K/UL (ref 4.6–13.2)

## 2019-07-23 PROCEDURE — 74011250637 HC RX REV CODE- 250/637: Performed by: OBSTETRICS & GYNECOLOGY

## 2019-07-23 PROCEDURE — 83735 ASSAY OF MAGNESIUM: CPT

## 2019-07-23 PROCEDURE — 80170 ASSAY OF GENTAMICIN: CPT

## 2019-07-23 PROCEDURE — 77030034849

## 2019-07-23 PROCEDURE — 74178 CT ABD&PLV WO CNTR FLWD CNTR: CPT

## 2019-07-23 PROCEDURE — 36415 COLL VENOUS BLD VENIPUNCTURE: CPT

## 2019-07-23 PROCEDURE — 74011250637 HC RX REV CODE- 250/637: Performed by: HOSPITALIST

## 2019-07-23 PROCEDURE — 85025 COMPLETE CBC W/AUTO DIFF WBC: CPT

## 2019-07-23 PROCEDURE — 74011636320 HC RX REV CODE- 636/320: Performed by: OBSTETRICS & GYNECOLOGY

## 2019-07-23 PROCEDURE — 74011250637 HC RX REV CODE- 250/637: Performed by: MIDWIFE

## 2019-07-23 PROCEDURE — 80048 BASIC METABOLIC PNL TOTAL CA: CPT

## 2019-07-23 PROCEDURE — 74011250636 HC RX REV CODE- 250/636: Performed by: MIDWIFE

## 2019-07-23 RX ORDER — OXYCODONE AND ACETAMINOPHEN 5; 325 MG/1; MG/1
1 TABLET ORAL
Qty: 20 TAB | Refills: 0 | Status: SHIPPED | OUTPATIENT
Start: 2019-07-23 | End: 2019-07-26

## 2019-07-23 RX ORDER — IBUPROFEN 600 MG/1
600 TABLET ORAL
Qty: 30 TAB | Refills: 0 | Status: SHIPPED | OUTPATIENT
Start: 2019-07-23

## 2019-07-23 RX ADMIN — IOPAMIDOL 100 ML: 755 INJECTION, SOLUTION INTRAVENOUS at 14:31

## 2019-07-23 RX ADMIN — OXYCODONE HYDROCHLORIDE AND ACETAMINOPHEN 2 TABLET: 5; 325 TABLET ORAL at 03:23

## 2019-07-23 RX ADMIN — DOCUSATE SODIUM 100 MG: 100 CAPSULE, LIQUID FILLED ORAL at 08:57

## 2019-07-23 RX ADMIN — OXYCODONE HYDROCHLORIDE AND ACETAMINOPHEN 2 TABLET: 5; 325 TABLET ORAL at 17:11

## 2019-07-23 RX ADMIN — CLINDAMYCIN PHOSPHATE 900 MG: 900 INJECTION, SOLUTION INTRAVENOUS at 01:13

## 2019-07-23 RX ADMIN — IBUPROFEN 800 MG: 400 TABLET ORAL at 11:06

## 2019-07-23 RX ADMIN — MULTIPLE VITAMINS W/ MINERALS TAB 1 TABLET: TAB at 08:57

## 2019-07-23 NOTE — PROGRESS NOTES
Bedside shift change report given to Prudencio Heard RN (oncoming nurse) by Evans Reynaga RN   (offgoing nurse). Report given with SBAR, Kristen, MAR and Recent Results.

## 2019-07-23 NOTE — DISCHARGE INSTRUCTIONS
POST DELIVERY DISCHARGE INSTRUCTIONS    Name: Sharda Kelley  YOB: 1985  Primary Diagnosis: Active Problems:    Maternal care for scar from previous  delivery (2019)      38 weeks gestation of pregnancy (2019)      Postpartum hemorrhage (2019)      Hypotension due to blood loss (2019)      Anemia due to acute blood loss (2019)      Hypomagnesemia (2019)        General:     Diet/Diet Restrictions:  Eight 8-ounce glasses of fluid daily (water, juices); avoid excessive caffeine intake. Meals/snacks as desired which are high in fiber and carbohydrates and low in fat and cholesterol. Physical Activity / Restrictions / Safety:     Avoid heavy lifting, no more that 8 lbs. For 2-3 weeks; limit use of stairs to 2 times daily for the first week home. No driving for one week. Avoid intercourse 4-6 weeks, no douching or tampon use. Check with obstetrician before starting or resuming an exercise program.         Discharge Instructions/Special Treatment/Home Care Needs:     Continue prenatal vitamins. Continue to use squirt bottle with warm water on your episiotomy after each bathroom use until bleeding stops. If steri-strips applied to your incision, remove in 7-10 days. Call your doctor for the following:     Fever over 101 degrees by mouth. Vaginal bleeding heavier than a normal menstrual period or clot larger than a golf ball. Red streaks or increased swelling of legs, painful red streaks on your breast.  Painful urination, constipation and increased pain or swelling or discharge with your incision. If you feel extremely anxious or overwhelmed. If you have thoughts of harming yourself and/or your baby. Pain Management:     Pain Management:   Take Acetaminophen (Tylenol) or Ibuprofen (Advil, Motrin), as directed for pain. Use a warm Sitz bath 3 times daily to relieve episiotomy or hemorrhoidal discomfort. Heating pad to  incision as needed.  For hemorrhoidal discomfort, use Tucks and Anusol cream as needed and directed. Follow-Up Care: These are general instructions for a healthy lifestyle:    No smoking/ No tobacco products/ Avoid exposure to second hand smoke    Surgeon General's Warning:  Quitting smoking now greatly reduces serious risk to your health. Obesity, smoking, and sedentary lifestyle greatly increases your risk for illness    A healthy diet, regular physical exercise & weight monitoring are important for maintaining a healthy lifestyle    Recognize signs and symptoms of STROKE:    F-face looks uneven    A-arms unable to move or move unevenly    S-speech slurred or non-existent    T-time-call 911 as soon as signs and symptoms begin-DO NOT go       Back to bed or wait to see if you get better-TIME IS BRAIN. Patient Education           Section: What to Expect at Home  Your Recovery    A  section, or , is surgery to deliver your baby through a cut, called an incision, that the doctor makes in your lower belly and uterus. You may have some pain in your lower belly and need pain medicine for 1 to 2 weeks. You can expect some vaginal bleeding for several weeks. You will probably need about 6 weeks to fully recover. It is important to take it easy while the incision is healing. Avoid heavy lifting, strenuous activities, or exercises that strain the belly muscles while you are recovering. Ask a family member or friend for help with housework, cooking, and shopping. This care sheet gives you a general idea about how long it will take for you to recover. But each person recovers at a different pace. Follow the steps below to get better as quickly as possible. How can you care for yourself at home? Activity    · Rest when you feel tired. Getting enough sleep will help you recover.     · Try to walk each day. Start by walking a little more than you did the day before.  Bit by bit, increase the amount you walk. Walking boosts blood flow and helps prevent pneumonia, constipation, and blood clots.     · Avoid strenuous activities, such as bicycle riding, jogging, weightlifting, and aerobic exercise, for 6 weeks or until your doctor says it is okay.     · Until your doctor says it is okay, do not lift anything heavier than your baby.     · Do not do sit-ups or other exercises that strain the belly muscles for 6 weeks or until your doctor says it is okay.     · Hold a pillow over your incision when you cough or take deep breaths. This will support your belly and decrease your pain.     · You may shower as usual. Pat the incision dry when you are done.     · You will have some vaginal bleeding. Wear sanitary pads. Do not douche or use tampons until your doctor says it is okay.     · Ask your doctor when you can drive again.     · You will probably need to take at least 6 weeks off work. It depends on the type of work you do and how you feel.     · Ask your doctor when it is okay for you to have sex. Diet    · You can eat your normal diet. If your stomach is upset, try bland, low-fat foods like plain rice, broiled chicken, toast, and yogurt.     · Drink plenty of fluids (unless your doctor tells you not to).     · You may notice that your bowel movements are not regular right after your surgery. This is common. Try to avoid constipation and straining with bowel movements. You may want to take a fiber supplement every day. If you have not had a bowel movement after a couple of days, ask your doctor about taking a mild laxative.     · If you are breastfeeding, limit alcohol. Alcohol can cause a lack of energy and other health problems for the baby when a breastfeeding woman drinks heavily. It can also get in the way of a mom's ability to feed her baby or to care for the child in other ways. There isn't a lot of research about exactly how much alcohol can harm a baby. Having no alcohol is the safest choice for your baby.  If you choose to have a drink now and then, have only one drink, and limit the number of occasions that you have a drink. Wait to breastfeed at least 2 hours after you have a drink to reduce the amount of alcohol the baby may get in the milk. Medicines    · Your doctor will tell you if and when you can restart your medicines. He or she will also give you instructions about taking any new medicines.     · If you take blood thinners, such as warfarin (Coumadin), clopidogrel (Plavix), or aspirin, be sure to talk to your doctor. He or she will tell you if and when to start taking those medicines again. Make sure that you understand exactly what your doctor wants you to do.     · Take pain medicines exactly as directed. ? If the doctor gave you a prescription medicine for pain, take it as prescribed. ? If you are not taking a prescription pain medicine, ask your doctor if you can take an over-the-counter medicine.     · If you think your pain medicine is making you sick to your stomach:  ? Take your medicine after meals (unless your doctor has told you not to). ? Ask your doctor for a different pain medicine.     · If your doctor prescribed antibiotics, take them as directed. Do not stop taking them just because you feel better. You need to take the full course of antibiotics. Incision care    · If you have strips of tape on the incision, leave the tape on for a week or until it falls off.     · Wash the area daily with warm, soapy water, and pat it dry. Don't use hydrogen peroxide or alcohol, which can slow healing. You may cover the area with a gauze bandage if it weeps or rubs against clothing. Change the bandage every day.     · Keep the area clean and dry. Other instructions    · If you breastfeed your baby, you may be more comfortable while you are healing if you place the baby so that he or she is not resting on your belly.  Try tucking your baby under your arm, with his or her body along the side you will be feeding on. Support your baby's upper body with your arm. With that hand you can control your baby's head to bring his or her mouth to your breast. This is sometimes called the football hold. Follow-up care is a key part of your treatment and safety. Be sure to make and go to all appointments, and call your doctor if you are having problems. It's also a good idea to know your test results and keep a list of the medicines you take. When should you call for help? [id number] anytime you think you may need emergency care. For example, call if:    · You have thoughts of harming yourself, your baby, or another person.     · You passed out (lost consciousness).     · You have chest pain, are short of breath, or cough up blood.     · You have a seizure.    Call your doctor now or seek immediate medical care if:    · You have pain that does not get better after you take pain medicine.     · You have severe vaginal bleeding.     · You are dizzy or lightheaded, or you feel like you may faint.     · You have new or worse pain in your belly or pelvis.     · You have loose stitches, or your incision comes open.     · You have symptoms of infection, such as:  ? Increased pain, swelling, warmth, or redness. ? Red streaks leading from the incision. ? Pus draining from the incision. ? A fever.     · You have symptoms of a blood clot in your leg (called a deep vein thrombosis), such as:  ? Pain in your calf, back of the knee, thigh, or groin. ? Redness and swelling in your leg or groin.     · You have signs of preeclampsia, such as:  ? Sudden swelling of your face, hands, or feet. ? New vision problems (such as dimness, blurring, or seeing spots). ? A severe headache.    Watch closely for changes in your health, and be sure to contact your doctor if:    · You do not get better as expected. Where can you learn more? Go to http://herson-pavithra.info/.   Enter M806 in the search box to learn more about \" Section: What to Expect at Home. \"  Current as of: 2018  Content Version: 12.1  © 7199-8331 [employer]. Care instructions adapted under license by [employer] (which disclaims liability or warranty for this information). If you have questions about a medical condition or this instruction, always ask your healthcare professional. [address] any warranty or liability for your use of this information.

## 2019-07-23 NOTE — PROGRESS NOTES
Bedside and Verbal shift change report given to SHAZIA Nava RN (oncoming nurse) by KEVEN Benedict RN (offgoing nurse). Report given with Kristen STONER and MAR.

## 2019-07-23 NOTE — PROGRESS NOTES
1200 Spoke with CT and they said inserting a roldan will help speed patients CT scans. [address] with Jennifer Godwin CNM to get order for roldan. 1410 Patient to CT for tests. Patient stable.

## 2019-07-23 NOTE — PROGRESS NOTES
Hospitalist Progress Note-critical care note     Patient: Alexander Roman MRN: [id number]  CSN: [id number]    YOB: 1985  Age: 28 y.o. Sex: female    DOA: 2019 LOS:  LOS: 4 days            Chief complaint: hypomagnesemia. Hypotension due to blood loss , anemia due to acute blood loss     Assessment/Plan         Hospital Problems  Date Reviewed: 2019          Codes Class Noted POA    Hypomagnesemia ICD-10-CM: E83.42  ICD-9-CM: 275.2  2019 Unknown        Hypotension due to blood loss ICD-10-CM: I95.89  ICD-9-CM: 458.8  2019 Unknown        Anemia due to acute blood loss ICD-10-CM: D62  ICD-9-CM: 285.1  2019         Postpartum hemorrhage ICD-10-CM: O72.1  ICD-9-CM: 666.10  2019 No        Maternal care for scar from previous  delivery ICD-10-CM: O34.219  ICD-9-CM: 654.20  2019 Unknown        38 weeks gestation of pregnancy ICD-10-CM: Z3A.38  ICD-9-CM: V22.2  2019 Unknown            Hypotension due to blood loss   bp still stable   Resolved per iv infusion and blood transfusion       Postpartum hemorrhage/anemia -due to acute blood loss   minimor bleeding  No symptoms now, continue iron supplement       Hypomagnesemia   Mg replacement       Subjective: feel fine     Family was at the bedside     Medicine will sign off, call us if needed     Hr better       Review of systems:    General: No fevers or chills. Cardiovascular: No chest pain or pressure. No palpitations. Pulmonary: No shortness of breath. Gastrointestinal: No nausea, vomiting. Gyn; a little bit  vaginal bleeding     Vital signs/Intake and Output:  Visit Vitals  /45 (BP 1 Location: Right arm, BP Patient Position: At rest)   Pulse 85   Temp 98.5 °F (36.9 °C)   Resp 17   Ht 5' 4\" (1.626 m)   Wt 106.1 kg (234 lb)   SpO2 100%   Breastfeeding?  Unknown   BMI 40.17 kg/m²     Current Shift:   0701 -  1900  In: 9130.1 [I.V.:9130.1]  Out: 600 [Urine:600]  Last three shifts:   1901 - 07/23 0700  In: 562   Out: 1100 [Urine:1100]    Physical Exam:  General: WD, WN. Alert, cooperative, no acute distress    HEENT: NC, Atraumatic. PERRLA, anicteric sclerae. Lungs: CTA Bilaterally. No Wheezing/Rhonchi/Rales. Heart:  Rrr.    No murmur, No Rubs, No Gallops  Abdomen: Soft, Non distended, Non tender.  +Bowel sounds,   Extremities: No c/c/e  Psych:   Not anxious or agitated. Neurologic:  No acute neurological deficit. Labs: Results:       Chemistry Recent Labs     07/23/19 0250 07/22/19  1540 07/21/19  0437  07/20/19  1759   GLU 88 94 112*   < > 146*    141 139   < > 139   K 4.1 4.0 4.4   < > 5.1    109 110   < > 110   CO2 25 26 23   < > 21   BUN 7 9 6*   < > 4*   CREA 0.67 0.60 0.72   < > 0.87   CA 7.8* 7.8* 7.7*   < > 7.3*   AGAP 7 6 6   < > 8   BUCR 10* 15 8*   < > 5*   AP  --   --   --   --  78   TP  --   --   --   --  2.9*   ALB  --   --   --   --  1.3*   GLOB  --   --   --   --  1.6*   AGRAT  --   --   --   --  0.8    < > = values in this interval not displayed. CBC w/Diff Recent Labs     07/23/19 0250 07/22/19  1540 07/22/19  0244 07/21/19  0437 07/20/19  2350   WBC 16.1*  --  17.2* 18.0* 19.7*   RBC 2.93*  --  2.58* 3.09* 3.00*   HGB 6.7* 6.2* 5.9* 7.2* 6.9*   HCT 20.3* 18.6* 18.0* 21.8* 20.8*     --  139 108* 145   GRANS 76*  --   --   --  73   LYMPH 17*  --   --   --  15*   EOS 3  --   --   --  0      Cardiac Enzymes No results for input(s): CPK, CKND1, KESHAWN in the last 72 hours. No lab exists for component: CKRMB, TROIP   Coagulation Recent Labs     07/20/19 1759   PTP 15.9*   INR 1.3*       Lipid Panel No results found for: CHOL, CHOLPOCT, CHOLX, CHLST, CHOLV, 192645, HDL, LDL, LDLC, DLDLP, 240837, VLDLC, VLDL, TGLX, TRIGL, TRIGP, TGLPOCT, CHHD, CHHDX   BNP No results for input(s): BNPP in the last 72 hours.    Liver Enzymes Recent Labs     07/20/19 1759   TP 2.9*   ALB 1.3*   AP 78   SGOT 9*      Thyroid Studies No results found for: T4, T3U, TSH, TSHEXT, TSHEXT     Procedures/imaging: see electronic medical records for all procedures/Xrays and details which were not copied into this note but were reviewed prior to creation of Ava Gtz MD

## 2019-07-23 NOTE — LACTATION NOTE
Breastfeeding discharge teaching completed to include feeding on demand, foremilk and hindmilk importance, engorgement, mastitis, clogged ducts, pumping, breastmilk storage, and returning to work. Information given about unit and office phone numbers and encouraged mom to reach out if concerns arise, but that [address] [facility] would be calling her in the next few days to follow up on breastfeeding. Mom verbalized understanding and no questions at this time.

## 2019-07-23 NOTE — PROGRESS NOTES
Problem: Patient Education: Go to Patient Education Activity  Goal: Patient/Family Education  Outcome: Progressing Towards Goal     Problem: Falls - Risk of  Goal: *Absence of Falls  Description  Document Linnea Castañeda Fall Risk and appropriate interventions in the flowsheet. Outcome: Progressing Towards Goal  Note:   Fall Risk Interventions:  Mobility Interventions: Patient to call before getting OOB         Medication Interventions: Patient to call before getting OOB, Teach patient to arise slowly    Elimination Interventions: Call light in reach              Problem: Pressure Injury - Risk of  Goal: *Prevention of pressure injury  Description  Document Teddy Scale and appropriate interventions in the flowsheet.   Outcome: Progressing Towards Goal  Note:   Pressure Injury Interventions:  Sensory Interventions: Assess changes in LOC    Moisture Interventions: Absorbent underpads    Activity Interventions: Increase time out of bed, Pressure redistribution bed/mattress(bed type)    Mobility Interventions: Pressure redistribution bed/mattress (bed type)

## 2019-07-23 NOTE — DISCHARGE SUMMARY
Obstetrical Discharge Summary     Name: Becky Artis MRN: [id number]  SSN: [id number]    YOB: 1985  Age: 28 y.o. Sex: female      Admit Date: 2019    Discharge Date: 2019    Admitting Physician: Goran Smith MD     Attending Physician:  Nicholas Siddiqui MD     Discharge Diagnoses:   Information for the patient's :  Johnny Kelley [[id number]]   Delivery of a 3.68 kg female infant via , Low Transverse on 2019 at 3:06 PM  by Goran Smith. Apgars were 6  and 7 . Additional Diagnoses:   Problem List as of 2019 Date Reviewed: 2019          Codes Class Noted - Resolved    Hypomagnesemia ICD-10-CM: E83.42  ICD-9-CM: 275.2  2019 - Present        Hypotension due to blood loss ICD-10-CM: I95.89  ICD-9-CM: 458.8  2019 - Present        Anemia due to acute blood loss ICD-10-CM: D62  ICD-9-CM: 285.1  2019 - Present        Postpartum hemorrhage ICD-10-CM: O72.1  ICD-9-CM: 666.10  2019 - Present        Maternal care for scar from previous  delivery ICD-10-CM: O34.219  ICD-9-CM: 654.20  2019 - Present        38 weeks gestation of pregnancy ICD-10-CM: Z3A.38  ICD-9-CM: V22.2  2019 - Present              Lab Results   Component Value Date/Time    Rubella, External immune 2018    GrBStrep, External negative 2019     Recent Labs     19  0250   HGB 6.7*       Hospital Course:  27 yo  female admitted at 38 weeks in active labor. Pt had a history of 2 , 1  for breech and a successful . She progressed slowly with minimal pitocin augmentation and then failed to progress. She was taken for  section which was complicated by extensive adhesions, an extension of the uterine incision into the left parametrium and subsequent hemorrhage. The patient received 2 units PRBC and aggressive fluid hydration intraoperatively.   Please see details of the delivery in the operative report. The patient was transferred to the ICU for close monitoring. The hospitalist and intensivist teams were consulted to assist with management. She received an additional unit of PRBCs in the ICU due to a hemoglobin of 6.9. Repeat hemoglobin was 7.2. In addition, she was continued on gentamycin and clindamycin due to the prolonged surgery. Urine output was decreased but responded to bolus of normal saline. Vital signs remained stable overnight. On POD #1, the patient was transferred to the post partum floor in stable condition. Patient was without complaints. Incision was clean dry and intact. Fundus remained firm. Bleeding was minimal.  Pain medications and IV antibiotics continued. On POD #2, the patient was doing well and without complaints. She was ambulating and tolerating a regular diet. Her hemoglobin decreased to 5.9. An additional unit of PRBCs was given. Urology consulted for evaluation of the ureter on the left side. On POD #3, the patient was doing well. A CT urogram and cystogram were ordered which were normal.  Repeat hemoglobin was 6.7. The patient was symptomatic and declined any further transfusion. She was discharged to home with instructions to f/u in the office in 2 weeks. Patient Instructions:   Current Discharge Medication List      START taking these medications    Details   ibuprofen (MOTRIN) 600 mg tablet Take 1 Tab by mouth every six (6) hours as needed for Pain. Qty: 30 Tab, Refills: 0      oxyCODONE-acetaminophen (PERCOCET) 5-325 mg per tablet Take 1 Tab by mouth every four (4) hours as needed for Pain for up to 3 days. Max Daily Amount: 6 Tabs.   Qty: 20 Tab, Refills: 0    Associated Diagnoses: Maternal care due to low transverse uterine scar from previous  delivery         CONTINUE these medications which have NOT CHANGED    Details   prenatal vit-calcium-iron-fa (PRENATAL PLUS, CALCIUM CARB,) 27 mg iron- 1 mg tab Take 1 Tab by mouth daily. ferrous sulfate (IRON) 325 mg (65 mg iron) tablet Take  by mouth Daily (before breakfast). Reference my discharge instructions. Follow-up Appointments   Procedures    FOLLOW UP VISIT Appointment in: Two Weeks     Standing Status:   Standing     Number of Occurrences:   1     Order Specific Question:   Appointment in     Answer:    Two Weeks        Signed By:  Carey Ellison MD     July 23, 2019                       BST

## 2019-07-23 NOTE — PROGRESS NOTES
Post-Operative  Day 3    Ana María Steele       Assessment:   Hospital Problems  Date Reviewed: 2019          Codes Class Noted POA    Hypomagnesemia ICD-10-CM: E83.42  ICD-9-CM: 275.2  2019 Unknown        Hypotension due to blood loss ICD-10-CM: I95.89  ICD-9-CM: 458.8  2019 Unknown        Anemia due to acute blood loss ICD-10-CM: D62  ICD-9-CM: 285.1  2019         Postpartum hemorrhage ICD-10-CM: O72.1  ICD-9-CM: 666.10  2019 No        Maternal care for scar from previous  delivery ICD-10-CM: O34.219  ICD-9-CM: 654.20  2019 Unknown        38 weeks gestation of pregnancy ICD-10-CM: Z3A.38  ICD-9-CM: V22.2  2019 Unknown            Post-Op day 3, doing well    Plan:   1. Possible discharge home today, pending workup by urology to evaluate the bladder and ureter  2. Follow up in office in 2 weeks with Thai Bennett MD  3. Post partum activity/wound care advised, diet as tolerated  4. Discharge Medications: ibuprofen, percocet and medications prior to admission  5. Anemia - pt will continue iron BID.  hgb today is 6.7, but pt is asymptomatic and prefers not to have another unit. Information for the patient's :  Carmen Baez [[id number]]   , Low Transverse   Patient doing well without significant complaint. Tolerating diet, passing flatus, voiding and ambulating without difficulty. Pt denies dizziness and chest pain.     Current Facility-Administered Medications   Medication Dose Route Frequency    docusate sodium (COLACE) capsule 100 mg  100 mg Oral DAILY    multivitamin, tx-iron-ca-min (THERA-M w/ IRON) tablet 1 Tab  1 Tab Oral DAILY    Gentamicin Random Level with am labs 19  1 Each Other ONCE    0.9% sodium chloride infusion  75 mL/hr IntraVENous CONTINUOUS    gentamicin (GARAMYCIN) 530 mg in 0.9% sodium chloride 100 mL IVPB  530 mg IntraVENous Q24H    clindamycin (CLEOCIN) 900mg NS 50mL IVPB (premix)  900 mg IntraVENous Q8H    sodium chloride (NS) flush 5-40 mL  5-40 mL IntraVENous Q8H       Vitals:  Visit Vitals  /47   Pulse (!) 103   Temp 98.8 °F (37.1 °C)   Resp 18   Ht 5' 4\" (1.626 m)   Wt 106.1 kg (234 lb)   LMP 10/18/2018   SpO2 100%   Breastfeeding? Unknown   BMI 40.17 kg/m²     Temp (24hrs), Av.7 °F (37.1 °C), Min:98.3 °F (36.8 °C), Max:99.4 °F (37.4 °C)        Exam:        Patient without distress. Abdomen, bowel sounds present, soft, expected tenderness, fundus firm     Wound incision clean, dry and intact                Lower extremities are negative for swelling, cords or tenderness.     Labs:   Lab Results   Component Value Date/Time    WBC 16.1 (H) 2019 02:50 AM    WBC 17.2 (H) 2019 02:44 AM    WBC 18.0 (H) 2019 04:37 AM    HGB 6.7 (L) 2019 02:50 AM    HGB 6.2 (L) 2019 03:40 PM    HGB 5.9 (LL) 2019 02:44 AM    HCT 20.3 (L) 2019 02:50 AM    HCT 18.6 (L) 2019 03:40 PM    HCT 18.0 (L) 2019 02:44 AM    PLATELET 313  02:50 AM    PLATELET 256  02:44 AM    PLATELET 141 (L)  04:37 AM       Recent Results (from the past 24 hour(s))   HGB & HCT    Collection Time: 19  3:40 PM   Result Value Ref Range    HGB 6.2 (L) 12.0 - 16.0 g/dL    HCT 18.6 (L) 35.0 - 86.4 %   METABOLIC PANEL, BASIC    Collection Time: 19  3:40 PM   Result Value Ref Range    Sodium 141 136 - 145 mmol/L    Potassium 4.0 3.5 - 5.5 mmol/L    Chloride 109 100 - 111 mmol/L    CO2 26 21 - 32 mmol/L    Anion gap 6 3.0 - 18 mmol/L    Glucose 94 74 - 99 mg/dL    BUN 9 7.0 - 18 MG/DL    Creatinine 0.60 0.6 - 1.3 MG/DL    BUN/Creatinine ratio 15 12 - 20      GFR est AA >60 >60 ml/min/1.73m2    GFR est non-AA >60 >60 ml/min/1.73m2    Calcium 7.8 (L) 8.5 - 87.1 MG/DL   METABOLIC PANEL, BASIC    Collection Time: 19  2:50 AM   Result Value Ref Range    Sodium 143 136 - 145 mmol/L    Potassium 4.1 3.5 - 5.5 mmol/L    Chloride 111 100 - 111 mmol/L    CO2 25 21 - 32 mmol/L    Anion gap 7 3.0 - 18 mmol/L    Glucose 88 74 - 99 mg/dL    BUN 7 7.0 - 18 MG/DL    Creatinine 0.67 0.6 - 1.3 MG/DL    BUN/Creatinine ratio 10 (L) 12 - 20      GFR est AA >60 >60 ml/min/1.73m2    GFR est non-AA >60 >60 ml/min/1.73m2    Calcium 7.8 (L) 8.5 - 10.1 MG/DL   MAGNESIUM    Collection Time: 07/23/19  2:50 AM   Result Value Ref Range    Magnesium 1.6 1.6 - 2.6 mg/dL   CBC WITH AUTOMATED DIFF    Collection Time: 07/23/19  2:50 AM   Result Value Ref Range    WBC 16.1 (H) 4.6 - 13.2 K/uL    RBC 2.93 (L) 4.20 - 5.30 M/uL    HGB 6.7 (L) 12.0 - 16.0 g/dL    HCT 20.3 (L) 35.0 - 45.0 %    MCV 69.3 (L) 74.0 - 97.0 FL    MCH 22.9 (L) 24.0 - 34.0 PG    MCHC 33.0 31.0 - 37.0 g/dL    RDW 21.2 (H) 11.6 - 14.5 %    PLATELET 189 934 - 656 K/uL    MPV 10.6 9.2 - 11.8 FL    NEUTROPHILS 76 (H) 42 - 75 %    BAND NEUTROPHILS 2 0 - 5 %    LYMPHOCYTES 17 (L) 20 - 51 %    MONOCYTES 2 2 - 9 %    EOSINOPHILS 3 0 - 5 %    BASOPHILS 0 0 - 3 %    ABS. NEUTROPHILS 12.2 (H) 1.8 - 8.0 K/UL    ABS. LYMPHOCYTES 2.7 0.8 - 3.5 K/UL    ABS. MONOCYTES 0.3 0 - 1.0 K/UL    ABS. EOSINOPHILS 0.5 (H) 0.0 - 0.4 K/UL    ABS.  BASOPHILS 0.0 0.0 - 0.1 K/UL    PLATELET COMMENTS 1+ LARGE PLATELETS     RBC COMMENTS ANISOCYTOSIS  2+        RBC COMMENTS POLYCHROMASIA  SLIGHT  POIKILOCYTOSIS  1+        RBC COMMENTS OVALOCYTES  1+        DF MANUAL

## 2019-07-23 NOTE — PROGRESS NOTES
Ct urogram and cystogram normal. Discussed results with patient by phone. Discharge to home.       F/u in 2 weeks

## 2022-06-08 ENCOUNTER — TRANSCRIBE ORDER (OUTPATIENT)
Dept: REGISTRATION | Age: 37
End: 2022-06-08

## 2022-06-08 ENCOUNTER — HOSPITAL ENCOUNTER (OUTPATIENT)
Dept: GENERAL RADIOLOGY | Age: 37
Discharge: HOME | End: 2022-06-08
Payer: MEDICAID

## 2022-06-08 DIAGNOSIS — M54.50 LUMBAGO: ICD-10-CM

## 2022-06-08 DIAGNOSIS — M54.50 LUMBAGO: Primary | ICD-10-CM

## 2022-06-08 PROCEDURE — 72110 X-RAY EXAM L-2 SPINE 4/>VWS: CPT

## (undated) DEVICE — SUTURE VCRL + SZ 3-0 L36IN ABSRB UD L36MM CT-1 1/2 CIR VCP944H

## (undated) DEVICE — SPONGE HEMOSTAT CELLULS 4X8IN -- SURGICEL

## (undated) DEVICE — STRAP,POSITIONING,KNEE/BODY,FOAM,4X60": Brand: MEDLINE

## (undated) DEVICE — SUTURE VCRL SZ 0 L36IN ABSRB VLT L40MM CT 1/2 CIR J358H

## (undated) DEVICE — STERILE POLYISOPRENE POWDER-FREE SURGICAL GLOVES: Brand: PROTEXIS

## (undated) DEVICE — SPONGE GZ W4XL4IN COT 12 PLY TYP VII WVN C FLD DSGN

## (undated) DEVICE — PACK PROCEDURE SURG BIRTH

## (undated) DEVICE — SUTURE VCRL SZ 3-0 L27IN ABSRB UD KS L60MM STR REV CUT NDL J663H

## (undated) DEVICE — SUTURE MCRYL SZ 1 L36IN ABSRB UD L36MM CT-1 1/2 CIR Y947H

## (undated) DEVICE — SUTURE MCRYL SZ 2-0 L36IN ABSRB UD L36MM CT-1 1/2 CIR Y945H

## (undated) DEVICE — SUTURE MCRYL SZ 1 L36IN ABSRB VLT CT L40MM 1/2 CIR TAPR PNT Y359H

## (undated) DEVICE — INTENDED FOR TISSUE SEPARATION, AND OTHER PROCEDURES THAT REQUIRE A SHARP SURGICAL BLADE TO PUNCTURE OR CUT.: Brand: BARD-PARKER ® CARBON RIB-BACK BLADES

## (undated) DEVICE — PAPER STERILIZATION  POUCH: Brand: CARDINAL HEALTH

## (undated) DEVICE — SUT VCRL + 0 36IN CT1 UD --

## (undated) DEVICE — PAD,ABDOMINAL,5"X9",ST,LF,25/BX: Brand: MEDLINE INDUSTRIES, INC.

## (undated) DEVICE — KENDALL SCD EXPRESS SLEEVES, KNEE LENGTH, MEDIUM: Brand: KENDALL SCD

## (undated) DEVICE — 3L THIN WALL CAN: Brand: CRD

## (undated) DEVICE — REM POLYHESIVE ADULT PATIENT RETURN ELECTRODE: Brand: VALLEYLAB

## (undated) DEVICE — HEX-LOCKING BLADE ELECTRODE: Brand: EDGE

## (undated) DEVICE — SOL IRRIGATION INJ NACL 0.9% 500ML BTL

## (undated) DEVICE — SUTURE VCRL SZ 3-0 L27IN ABSRB UD L26MM SH 1/2 CIR J416H